# Patient Record
Sex: MALE | Race: WHITE | NOT HISPANIC OR LATINO | Employment: FULL TIME | URBAN - METROPOLITAN AREA
[De-identification: names, ages, dates, MRNs, and addresses within clinical notes are randomized per-mention and may not be internally consistent; named-entity substitution may affect disease eponyms.]

---

## 2017-01-07 ENCOUNTER — APPOINTMENT (OUTPATIENT)
Dept: GENERAL RADIOLOGY | Facility: HOSPITAL | Age: 52
End: 2017-01-07

## 2017-01-07 ENCOUNTER — HOSPITAL ENCOUNTER (EMERGENCY)
Facility: HOSPITAL | Age: 52
Discharge: HOME | End: 2017-01-08
Attending: EMERGENCY MEDICINE | Admitting: EMERGENCY MEDICINE

## 2017-01-07 DIAGNOSIS — N28.9 RENAL INSUFFICIENCY: ICD-10-CM

## 2017-01-07 DIAGNOSIS — M10.9 GOUTY ARTHRITIS: Primary | ICD-10-CM

## 2017-01-07 LAB
ALBUMIN SERPL-MCNC: 3.8 G/DL (ref 3.5–5.2)
ALBUMIN/GLOB SERPL: 0.9 G/DL
ALP SERPL-CCNC: 135 U/L (ref 39–117)
ALT SERPL W P-5'-P-CCNC: 48 U/L (ref 1–41)
ANION GAP SERPL CALCULATED.3IONS-SCNC: 16.5 MMOL/L
APPEARANCE FLD: ABNORMAL
AST SERPL-CCNC: 21 U/L (ref 1–40)
BASOPHILS # BLD AUTO: 0.03 10*3/MM3 (ref 0–0.2)
BASOPHILS NFR BLD AUTO: 0.2 % (ref 0–1.5)
BILIRUB SERPL-MCNC: 0.9 MG/DL (ref 0.1–1.2)
BUN BLD-MCNC: 39 MG/DL (ref 6–20)
BUN/CREAT SERPL: 23.1 (ref 7–25)
CALCIUM SPEC-SCNC: 9.7 MG/DL (ref 8.6–10.5)
CHLORIDE SERPL-SCNC: 97 MMOL/L (ref 98–107)
CO2 SERPL-SCNC: 24.5 MMOL/L (ref 22–29)
COLOR FLD: ABNORMAL
CREAT BLD-MCNC: 1.69 MG/DL (ref 0.76–1.27)
CRP SERPL-MCNC: 16.42 MG/DL (ref 0–0.5)
CRYSTALS FLD MICRO: NORMAL
DEPRECATED RDW RBC AUTO: 42.1 FL (ref 37–54)
EOSINOPHIL # BLD AUTO: 0.1 10*3/MM3 (ref 0–0.7)
EOSINOPHIL NFR BLD AUTO: 0.6 % (ref 0.3–6.2)
ERYTHROCYTE [DISTWIDTH] IN BLOOD BY AUTOMATED COUNT: 12.9 % (ref 11.5–14.5)
GFR SERPL CREATININE-BSD FRML MDRD: 43 ML/MIN/1.73
GLOBULIN UR ELPH-MCNC: 4.3 GM/DL
GLUCOSE BLD-MCNC: 125 MG/DL (ref 65–99)
HCT VFR BLD AUTO: 42.5 % (ref 40.4–52.2)
HGB BLD-MCNC: 13.6 G/DL (ref 13.7–17.6)
IMM GRANULOCYTES # BLD: 0.07 10*3/MM3 (ref 0–0.03)
IMM GRANULOCYTES NFR BLD: 0.4 % (ref 0–0.5)
LYMPHOCYTES # BLD AUTO: 1.21 10*3/MM3 (ref 0.9–4.8)
LYMPHOCYTES NFR BLD AUTO: 7.8 % (ref 19.6–45.3)
LYMPHOCYTES NFR FLD MANUAL: 8 %
MCH RBC QN AUTO: 28.8 PG (ref 27–32.7)
MCHC RBC AUTO-ENTMCNC: 32 G/DL (ref 32.6–36.4)
MCV RBC AUTO: 89.9 FL (ref 79.8–96.2)
METHOD: ABNORMAL
MONOCYTES # BLD AUTO: 1.4 10*3/MM3 (ref 0.2–1.2)
MONOCYTES NFR BLD AUTO: 9 % (ref 5–12)
MONOS+MACROS NFR FLD: 13 %
NEUTROPHILS # BLD AUTO: 12.76 10*3/MM3 (ref 1.9–8.1)
NEUTROPHILS NFR BLD AUTO: 82 % (ref 42.7–76)
NEUTROPHILS NFR FLD MANUAL: 79 %
NUC CELL # FLD: ABNORMAL /MM3
PLATELET # BLD AUTO: 251 10*3/MM3 (ref 140–500)
PMV BLD AUTO: 11.2 FL (ref 6–12)
POTASSIUM BLD-SCNC: 4.1 MMOL/L (ref 3.5–5.2)
PROT SERPL-MCNC: 8.1 G/DL (ref 6–8.5)
RBC # BLD AUTO: 4.73 10*6/MM3 (ref 4.6–6)
RBC # FLD AUTO: ABNORMAL /MM3
SODIUM BLD-SCNC: 138 MMOL/L (ref 136–145)
URATE SERPL-MCNC: 7.8 MG/DL (ref 3.4–7)
WBC NRBC COR # BLD: 15.57 10*3/MM3 (ref 4.5–10.7)

## 2017-01-07 PROCEDURE — 85025 COMPLETE CBC W/AUTO DIFF WBC: CPT | Performed by: EMERGENCY MEDICINE

## 2017-01-07 PROCEDURE — 87070 CULTURE OTHR SPECIMN AEROBIC: CPT | Performed by: PHYSICIAN ASSISTANT

## 2017-01-07 PROCEDURE — 87147 CULTURE TYPE IMMUNOLOGIC: CPT | Performed by: PHYSICIAN ASSISTANT

## 2017-01-07 PROCEDURE — 86140 C-REACTIVE PROTEIN: CPT | Performed by: EMERGENCY MEDICINE

## 2017-01-07 PROCEDURE — 89060 EXAM SYNOVIAL FLUID CRYSTALS: CPT | Performed by: PHYSICIAN ASSISTANT

## 2017-01-07 PROCEDURE — 80053 COMPREHEN METABOLIC PANEL: CPT | Performed by: EMERGENCY MEDICINE

## 2017-01-07 PROCEDURE — 87015 SPECIMEN INFECT AGNT CONCNTJ: CPT | Performed by: PHYSICIAN ASSISTANT

## 2017-01-07 PROCEDURE — 84550 ASSAY OF BLOOD/URIC ACID: CPT | Performed by: EMERGENCY MEDICINE

## 2017-01-07 PROCEDURE — 89051 BODY FLUID CELL COUNT: CPT | Performed by: PHYSICIAN ASSISTANT

## 2017-01-07 PROCEDURE — 87186 SC STD MICRODIL/AGAR DIL: CPT | Performed by: PHYSICIAN ASSISTANT

## 2017-01-07 PROCEDURE — 73560 X-RAY EXAM OF KNEE 1 OR 2: CPT

## 2017-01-07 PROCEDURE — 87205 SMEAR GRAM STAIN: CPT | Performed by: PHYSICIAN ASSISTANT

## 2017-01-07 PROCEDURE — 99284 EMERGENCY DEPT VISIT MOD MDM: CPT

## 2017-01-07 RX ORDER — PANTOPRAZOLE SODIUM 40 MG/1
1 TABLET, DELAYED RELEASE ORAL DAILY
Refills: 2 | COMMUNITY
Start: 2016-10-07

## 2017-01-07 RX ORDER — ZOLPIDEM TARTRATE 10 MG/1
1 TABLET ORAL NIGHTLY PRN
Refills: 0 | COMMUNITY
Start: 2016-11-29

## 2017-01-07 RX ORDER — ATENOLOL AND CHLORTHALIDONE TABLET 50; 25 MG/1; MG/1
1 TABLET ORAL DAILY
Refills: 0 | COMMUNITY
Start: 2016-10-07 | End: 2017-01-13 | Stop reason: HOSPADM

## 2017-01-07 RX ORDER — INDOMETHACIN 25 MG/1
1 CAPSULE ORAL 3 TIMES DAILY PRN
Refills: 2 | COMMUNITY
Start: 2016-11-29 | End: 2017-01-13 | Stop reason: HOSPADM

## 2017-01-07 RX ORDER — HYDROCODONE BITARTRATE AND ACETAMINOPHEN 7.5; 325 MG/1; MG/1
1 TABLET ORAL ONCE
Status: COMPLETED | OUTPATIENT
Start: 2017-01-07 | End: 2017-01-07

## 2017-01-07 RX ADMIN — HYDROCODONE BITARTRATE AND ACETAMINOPHEN 1 TABLET: 7.5; 325 TABLET ORAL at 21:12

## 2017-01-08 VITALS
SYSTOLIC BLOOD PRESSURE: 124 MMHG | HEIGHT: 67 IN | WEIGHT: 269 LBS | BODY MASS INDEX: 42.22 KG/M2 | RESPIRATION RATE: 18 BRPM | HEART RATE: 91 BPM | TEMPERATURE: 98.3 F | DIASTOLIC BLOOD PRESSURE: 81 MMHG | OXYGEN SATURATION: 97 %

## 2017-01-08 RX ORDER — COLCHICINE 0.6 MG/1
1.2 TABLET ORAL DAILY
Status: DISCONTINUED | OUTPATIENT
Start: 2017-01-08 | End: 2017-01-08 | Stop reason: HOSPADM

## 2017-01-08 RX ORDER — HYDROCODONE BITARTRATE AND ACETAMINOPHEN 7.5; 325 MG/1; MG/1
1 TABLET ORAL EVERY 6 HOURS PRN
Qty: 15 TABLET | Refills: 0 | Status: SHIPPED | OUTPATIENT
Start: 2017-01-08

## 2017-01-08 RX ORDER — COLCHICINE 0.6 MG/1
0.6 TABLET ORAL DAILY
Qty: 1 TABLET | Refills: 0 | Status: SHIPPED | OUTPATIENT
Start: 2017-01-08

## 2017-01-08 RX ADMIN — COLCHICINE 1.2 MG: 0.6 TABLET, FILM COATED ORAL at 00:31

## 2017-01-08 NOTE — ED PROVIDER NOTES
EMERGENCY DEPARTMENT ENCOUNTER    CHIEF COMPLAINT  Chief Complaint: right knee and ankle pain  History given by: patient  History limited by: nothing   Room Number: HALA/A  PMD: Kelsey Foy MD    HPI:  Pt is a 51 y.o. male who presents complaining of right knee and bilateral ankle pain onset approximately 4 days ago. Pt states the pain began as chest and right shoulder pain 3 weeks ago, at which time it was diagnosed at bicep tendonitis. Pt has been taking Indocin, Meloxicam, Diclofenac, and Advil. Pt has a h/o gout.     Duration:  3 weeks  Timing: constant  Location: right knee and bilateral ankle  Radiation: has moved from right shoulder/chest  Quality: new  Intensity/Severity: moderate   Progression: unchanged   Associated Symptoms: pain  Aggravating Factors: none specified   Alleviating Factors: none specified   Previous Episodes: h/o gout  Treatment before arrival: none specified     PAST MEDICAL HISTORY  Active Ambulatory Problems     Diagnosis Date Noted   • No Active Ambulatory Problems     Resolved Ambulatory Problems     Diagnosis Date Noted   • No Resolved Ambulatory Problems     Past Medical History   Diagnosis Date   • GERD (gastroesophageal reflux disease)    • Gout    • Hypertension    • Kidney stone        PAST SURGICAL HISTORY  Past Surgical History   Procedure Laterality Date   • Cholecystectomy     • Cystoscopy w/ laser lithotripsy         FAMILY HISTORY  History reviewed. No pertinent family history.    SOCIAL HISTORY  Social History     Social History   • Marital status:      Spouse name: N/A   • Number of children: N/A   • Years of education: N/A     Occupational History   • Not on file.     Social History Main Topics   • Smoking status: Never Smoker   • Smokeless tobacco: Not on file   • Alcohol use No   • Drug use: No   • Sexual activity: Defer     Other Topics Concern   • Not on file     Social History Narrative   • No narrative on file       ALLERGIES  Sulfa antibiotics    REVIEW  OF SYSTEMS  Review of Systems   Constitutional: Negative for activity change, appetite change and fever.   HENT: Negative for congestion and sore throat.    Eyes: Negative.    Respiratory: Negative for cough and shortness of breath.    Cardiovascular: Negative for chest pain and leg swelling.   Gastrointestinal: Negative for abdominal pain, diarrhea and vomiting.   Endocrine: Negative.    Genitourinary: Negative for decreased urine volume and dysuria.   Musculoskeletal: Negative for neck pain.   Skin: Negative for rash and wound.        bilateral ankle and right knee pain   Allergic/Immunologic: Negative.    Neurological: Negative for weakness, numbness and headaches.   Hematological: Negative.    Psychiatric/Behavioral: Negative.    All other systems reviewed and are negative.      PHYSICAL EXAM  ED Triage Vitals   Temp Heart Rate Resp BP SpO2   01/07/17 2001 01/07/17 2001 01/07/17 2001 01/07/17 2007 01/07/17 2001   98.5 °F (36.9 °C) 109 16 117/92 97 %      Temp src Heart Rate Source Patient Position BP Location FiO2 (%)   01/07/17 2001 01/07/17 2007 01/07/17 2007 01/07/17 2007 --   Tympanic Monitor Sitting Left arm        Physical Exam   Constitutional: He is oriented to person, place, and time and well-developed, well-nourished, and in no distress.   Eyes: EOM are normal.   Neck: Normal range of motion.   Cardiovascular: Normal rate and regular rhythm.    Pulmonary/Chest: Effort normal and breath sounds normal. No respiratory distress.   Musculoskeletal:        Right knee: He exhibits effusion and erythema.   Neurological: He is alert and oriented to person, place, and time. He has normal sensation and normal strength.   Skin: Skin is warm and dry.   Psychiatric: Affect normal.   Nursing note and vitals reviewed.      LAB RESULTS  Lab Results (last 24 hours)     Procedure Component Value Units Date/Time    CBC & Differential [[id number]] Collected:  01/07/17 2132    Specimen:  Blood Updated:  01/07/17 2151     Narrative:       The following orders were created for panel order CBC & Differential.  Procedure                               Abnormality         Status                     ---------                               -----------         ------                     CBC Auto Differential[[id number]]         Abnormal            Final result                 Please view results for these tests on the individual orders.    Comprehensive Metabolic Panel [[id number]]  (Abnormal) Collected:  01/07/17 2132    Specimen:  Blood from Arm, Right Updated:  01/07/17 2207     Glucose 125 (H) mg/dL      BUN 39 (H) mg/dL      Creatinine 1.69 (H) mg/dL      Sodium 138 mmol/L      Potassium 4.1 mmol/L      Chloride 97 (L) mmol/L      CO2 24.5 mmol/L      Calcium 9.7 mg/dL      Total Protein 8.1 g/dL      Albumin 3.80 g/dL      ALT (SGPT) 48 (H) U/L      AST (SGOT) 21 U/L      Alkaline Phosphatase 135 (H) U/L      Total Bilirubin 0.9 mg/dL      eGFR Non African Amer 43 (L) mL/min/1.73      Globulin 4.3 gm/dL      A/G Ratio 0.9 g/dL      BUN/Creatinine Ratio 23.1      Anion Gap 16.5 mmol/L     C-reactive Protein [[id number]]  (Abnormal) Collected:  01/07/17 2132    Specimen:  Blood from Arm, Right Updated:  01/07/17 2206     C-Reactive Protein 16.42 (H) mg/dL     Uric Acid [[id number]]  (Abnormal) Collected:  01/07/17 2132    Specimen:  Blood from Arm, Right Updated:  01/07/17 2206     Uric Acid 7.8 (H) mg/dL     CBC Auto Differential [[id number]]  (Abnormal) Collected:  01/07/17 2132    Specimen:  Blood from Arm, Right Updated:  01/07/17 2151     WBC 15.57 (H) 10*3/mm3      RBC 4.73 10*6/mm3      Hemoglobin 13.6 (L) g/dL      Hematocrit 42.5 %      MCV 89.9 fL      MCH 28.8 pg      MCHC 32.0 (L) g/dL      RDW 12.9 %      RDW-SD 42.1 fl      MPV 11.2 fL      Platelets 251 10*3/mm3      Neutrophil % 82.0 (H) %      Lymphocyte % 7.8 (L) %      Monocyte % 9.0 %      Eosinophil % 0.6 %      Basophil % 0.2 %      Immature Grans % 0.4 %      Neutrophils,  Absolute 12.76 (H) 10*3/mm3      Lymphocytes, Absolute 1.21 10*3/mm3      Monocytes, Absolute 1.40 (H) 10*3/mm3      Eosinophils, Absolute 0.10 10*3/mm3      Basophils, Absolute 0.03 10*3/mm3      Immature Grans, Absolute 0.07 (H) 10*3/mm3     Body Fluid Cell Count With Differential [[id number]] Collected:  01/07/17 2215    Specimen:  Body Fluid Updated:  01/07/17 2330    Narrative:       The following orders were created for panel order Body Fluid Cell Count With Differential.  Procedure                               Abnormality         Status                     ---------                               -----------         ------                     Body fluid cell count[[id number]]         Abnormal            Final result               Body fluid differential[[id number]]                           Final result                 Please view results for these tests on the individual orders.    Body fluid cell count [[id number]]  (Abnormal) Collected:  01/07/17 2215    Specimen:  Body Fluid from Knee, Right Updated:  01/07/17 2319     Color, Fluid Other       Dark yellow        Appearance, Fluid Cloudy (A)      RBC, Fluid 94752 /mm3       Estimated count due to clots present in specimen.        Nucleated Cells, Fluid 27432 /mm3       Estimated count due to clots present in specimen.         Method: Hemacytometer Method     Body fluid differential [[id number]] Collected:  01/07/17 2215    Specimen:  Body Fluid from Knee, Right Updated:  01/07/17 2330     Neutrophils, Fluid 79 %      Lymphocytes, Fluid 8 %      Mononuclear, Fluid 13 %     Body Fluid Culture [[id number]] Collected:  01/07/17 2216    Specimen:  Body Fluid from Knee, Right Updated:  01/08/17 0055     Gram Stain Result No organisms seen     Crystal Exam, Fluid [[id number]] Collected:  01/07/17 2252    Specimen:  Synovial Fluid from Knee, Right Updated:  01/07/17 2336     Crystals, Fluid        Intracellular crystals observed exhibiting polarization characteristics of Uric  Acid          I ordered the above labs and reviewed the results     RADIOLOGY  XR Knee 1 or 2 View Right   Preliminary Result   No acute fracture or dislocation. Moderately large knee joint effusion.             I ordered the above noted radiological studies. Interpreted by radiologist. Reviewed by me in PACS.       PROCEDURES  Arthrocentesis  Date/Time: 1/7/2017 9:54 PM  Performed by: JOSEMANUEL LUDWIG  Authorized by: JOSEMANUEL LUDWIG   Consent: Verbal consent obtained.  Risks and benefits: risks, benefits and alternatives were discussed  Consent given by: patient  Patient understanding: patient states understanding of the procedure being performed  Patient consent: the patient's understanding of the procedure matches consent given  Procedure consent: procedure consent matches procedure scheduled  Relevant documents: relevant documents present and verified  Test results: test results available and properly labeled  Site marked: the operative site was marked  Imaging studies: imaging studies available  Required items: required blood products, implants, devices, and special equipment available  Patient identity confirmed: verbally with patient  Indications: joint swelling, pain and diagnostic evaluation   Body area: knee  Joint: right knee  Local anesthesia used: yes  Anesthesia: local infiltration    Anesthesia:  Local anesthesia used: yes  Anesthesia: local infiltration  Local Anesthetic: lidocaine 1% without epinephrine   Anesthetic total: 10 mL  Sedation:  Patient sedated: no    Preparation: Patient was prepped and draped in the usual sterile fashion.  Needle size: 18 G  Ultrasound guidance: no  Aspirate: blood-tinged  Patient tolerance: Patient tolerated the procedure well with no immediate complications        PROGRESS AND CONSULTS  ED Course   Value Comment By Time   Color, Fluid: Other (Reviewed) Litzy Chan MD 01/08 0001 6511: Pt refuses to have fluid removed from knee at this time. Ordered XR right knee,  labs, and norco.     2114: Discussed pt's case with Dr. Beckford, who agrees with plan of care.     2139: Rechecked pt. Pt is unchanged. I encouraged pt to allow us to remove fluid to evaluate for gout.    0005: Rechecked pt. Pt is resting comfortably. Discussed elevated kidney function tests, and pt's spouse reports that it has been elevated in the past.     0031: Rechecked pt. Discussed dx of gout and plan for discharge and PCP f/u in a few days. Pt understands and agrees with plan for discharge and all questions were addressed.     MEDICAL DECISION MAKING  Results were reviewed/discussed with the patient and they were also made aware of online access. Pt also made aware that some labs, such as cultures, will not be resulted during ER visit and follow up with PMD is necessary.     MDM  Number of Diagnoses or Management Options     Amount and/or Complexity of Data Reviewed  Clinical lab tests: ordered and reviewed  Tests in the radiology section of CPT®: ordered and reviewed    Patient Progress  Patient progress: stable         DIAGNOSIS  Final diagnoses:   Gouty arthritis   Renal insufficiency       DISPOSITION  DISCHARGE    Patient discharged in stable condition.    Reviewed implications of results, diagnosis, meds, responsibility to follow up, warning signs and symptoms of possible worsening, potential complications and reasons to return to ER, including worsening of sx.    Patient/Family voiced understanding of above instructions.    Discussed plan for discharge, as there is no emergent indication for admission.  Pt/family is agreeable and understands need for follow up and repeat testing.  Pt is aware that discharge does not mean that nothing is wrong but it indicates no emergency is present that requires admission and they must continue care with follow-up as given below or physician of their choice.     FOLLOW-UP  Kelsey Foy MD  [address] [facility] [facility] [address] [facility] [address] [phone]    In 2 days  For further  evaluation and treatment if not better         Medication List      New Prescriptions          colchicine 0.6 MG tablet   Take 1 tablet by mouth Daily.       HYDROcodone-acetaminophen 7.5-325 MG per tablet   Commonly known as:  NORCO   Take 1 tablet by mouth Every 6 (Six) Hours As Needed for severe pain   (7-10).         Stop          diclofenac 75 MG EC tablet   Commonly known as:  VOLTAREN       indomethacin 25 MG capsule   Commonly known as:  INDOCIN           Latest Documented Vital Signs:  As of 4:39 AM  BP- 124/81 HR- 91 Temp- 98.3 °F (36.8 °C) (Oral) O2 sat- 97%    --  Documentation assistance provided by kristie Becerra for Jaime Braswell.  Information recorded by the cassiibjenifer was done at my direction and has been verified and validated by me.              Beronica Becerra  01/08/17 0036       KEVIN Amin III  01/08/17 0438       KEVIN Amin III  01/08/17 0439

## 2017-01-08 NOTE — ED PROVIDER NOTES
51 y.o. male presents c/o RLE pain and swelling onset 4 days ago. Hx of gout.     On exam:  Awake and alert  Right knee tender swollen with effusion  No significant erythema to the knee    Will do arthrocentesis.     I supervised care provided by the midlevel provider.  We have discussed this patient's history, physical exam, and treatment plan.  I have reviewed the note and personally saw and examined the patient and agree with the plan of care.    --  Documentation assistance provided by kristie Bunch.  Information recorded by the kristie was done at my direction and has been verified and validated by me.     Manjula Bunch  01/07/17 9681       Kris Beckford MD  01/10/17 1211

## 2017-01-08 NOTE — DISCHARGE INSTRUCTIONS
Do not take ibuprofen, naproxen, or other NSAIDs.  Return to the ER with any further concerns.  Follow up with your family physician in next 48-72 hours for further evaluation of therapy and to discuss renal insufficiency.  Avoid purines

## 2017-01-11 ENCOUNTER — HOSPITAL ENCOUNTER (INPATIENT)
Facility: HOSPITAL | Age: 52
LOS: 1 days | Discharge: HOME | End: 2017-01-13
Attending: EMERGENCY MEDICINE | Admitting: HOSPITALIST

## 2017-01-11 DIAGNOSIS — M13.0 POLYARTICULAR ARTHRITIS: Primary | ICD-10-CM

## 2017-01-11 DIAGNOSIS — R26.2 DIFFICULTY WALKING: ICD-10-CM

## 2017-01-11 DIAGNOSIS — N28.9 RENAL INSUFFICIENCY: ICD-10-CM

## 2017-01-11 LAB
ALBUMIN SERPL-MCNC: 3.7 G/DL (ref 3.5–5.2)
ALBUMIN/GLOB SERPL: 0.8 G/DL
ALP SERPL-CCNC: 162 U/L (ref 39–117)
ALT SERPL W P-5'-P-CCNC: 48 U/L (ref 1–41)
ANION GAP SERPL CALCULATED.3IONS-SCNC: 12.9 MMOL/L
AST SERPL-CCNC: 28 U/L (ref 1–40)
BACTERIA FLD CULT: ABNORMAL
BACTERIA FLD CULT: ABNORMAL
BASOPHILS # BLD AUTO: 0.11 10*3/MM3 (ref 0–0.2)
BASOPHILS NFR BLD AUTO: 0.9 % (ref 0–1.5)
BILIRUB SERPL-MCNC: 0.7 MG/DL (ref 0.1–1.2)
BUN BLD-MCNC: 46 MG/DL (ref 6–20)
BUN/CREAT SERPL: 26.6 (ref 7–25)
CALCIUM SPEC-SCNC: 9.7 MG/DL (ref 8.6–10.5)
CHLORIDE SERPL-SCNC: 96 MMOL/L (ref 98–107)
CO2 SERPL-SCNC: 29.1 MMOL/L (ref 22–29)
CREAT BLD-MCNC: 1.73 MG/DL (ref 0.76–1.27)
CRP SERPL-MCNC: 11.33 MG/DL (ref 0–0.5)
D-LACTATE SERPL-SCNC: 0.8 MMOL/L (ref 0.5–2)
DEPRECATED RDW RBC AUTO: 44.2 FL (ref 37–54)
EOSINOPHIL # BLD AUTO: 0.23 10*3/MM3 (ref 0–0.7)
EOSINOPHIL NFR BLD AUTO: 1.9 % (ref 0.3–6.2)
ERYTHROCYTE [DISTWIDTH] IN BLOOD BY AUTOMATED COUNT: 13.2 % (ref 11.5–14.5)
ERYTHROCYTE [SEDIMENTATION RATE] IN BLOOD: 44 MM/HR (ref 0–15)
GFR SERPL CREATININE-BSD FRML MDRD: 42 ML/MIN/1.73
GLOBULIN UR ELPH-MCNC: 4.7 GM/DL
GLUCOSE BLD-MCNC: 127 MG/DL (ref 65–99)
GRAM STN SPEC: ABNORMAL
HCT VFR BLD AUTO: 43.1 % (ref 40.4–52.2)
HGB BLD-MCNC: 13.3 G/DL (ref 13.7–17.6)
IMM GRANULOCYTES # BLD: 0.04 10*3/MM3 (ref 0–0.03)
IMM GRANULOCYTES NFR BLD: 0.3 % (ref 0–0.5)
LYMPHOCYTES # BLD AUTO: 1.81 10*3/MM3 (ref 0.9–4.8)
LYMPHOCYTES NFR BLD AUTO: 15 % (ref 19.6–45.3)
MCH RBC QN AUTO: 28.4 PG (ref 27–32.7)
MCHC RBC AUTO-ENTMCNC: 30.9 G/DL (ref 32.6–36.4)
MCV RBC AUTO: 92.1 FL (ref 79.8–96.2)
MONOCYTES # BLD AUTO: 0.9 10*3/MM3 (ref 0.2–1.2)
MONOCYTES NFR BLD AUTO: 7.5 % (ref 5–12)
NEUTROPHILS # BLD AUTO: 8.96 10*3/MM3 (ref 1.9–8.1)
NEUTROPHILS NFR BLD AUTO: 74.4 % (ref 42.7–76)
PLATELET # BLD AUTO: 367 10*3/MM3 (ref 140–500)
PMV BLD AUTO: 10.7 FL (ref 6–12)
POTASSIUM BLD-SCNC: 4.8 MMOL/L (ref 3.5–5.2)
PROCALCITONIN SERPL-MCNC: 0.17 NG/ML (ref 0.1–0.25)
PROT SERPL-MCNC: 8.4 G/DL (ref 6–8.5)
RBC # BLD AUTO: 4.68 10*6/MM3 (ref 4.6–6)
SODIUM BLD-SCNC: 138 MMOL/L (ref 136–145)
URATE SERPL-MCNC: 12.2 MG/DL (ref 3.4–7)
WBC NRBC COR # BLD: 12.05 10*3/MM3 (ref 4.5–10.7)

## 2017-01-11 PROCEDURE — 84550 ASSAY OF BLOOD/URIC ACID: CPT | Performed by: EMERGENCY MEDICINE

## 2017-01-11 PROCEDURE — 80053 COMPREHEN METABOLIC PANEL: CPT | Performed by: EMERGENCY MEDICINE

## 2017-01-11 PROCEDURE — 83605 ASSAY OF LACTIC ACID: CPT | Performed by: EMERGENCY MEDICINE

## 2017-01-11 PROCEDURE — 99284 EMERGENCY DEPT VISIT MOD MDM: CPT

## 2017-01-11 PROCEDURE — 86140 C-REACTIVE PROTEIN: CPT | Performed by: EMERGENCY MEDICINE

## 2017-01-11 PROCEDURE — 85652 RBC SED RATE AUTOMATED: CPT | Performed by: EMERGENCY MEDICINE

## 2017-01-11 PROCEDURE — 85025 COMPLETE CBC W/AUTO DIFF WBC: CPT | Performed by: EMERGENCY MEDICINE

## 2017-01-11 PROCEDURE — 36415 COLL VENOUS BLD VENIPUNCTURE: CPT | Performed by: EMERGENCY MEDICINE

## 2017-01-11 PROCEDURE — 84145 PROCALCITONIN (PCT): CPT | Performed by: EMERGENCY MEDICINE

## 2017-01-11 RX ORDER — SODIUM CHLORIDE 9 MG/ML
125 INJECTION, SOLUTION INTRAVENOUS CONTINUOUS
Status: DISCONTINUED | OUTPATIENT
Start: 2017-01-11 | End: 2017-01-12

## 2017-01-11 RX ORDER — SODIUM CHLORIDE 0.9 % (FLUSH) 0.9 %
10 SYRINGE (ML) INJECTION AS NEEDED
Status: DISCONTINUED | OUTPATIENT
Start: 2017-01-11 | End: 2017-01-13 | Stop reason: HOSPADM

## 2017-01-11 RX ADMIN — SODIUM CHLORIDE 500 ML: 9 INJECTION, SOLUTION INTRAVENOUS at 23:12

## 2017-01-11 NOTE — IP AVS SNAPSHOT
AFTER VISIT SUMMARY             Farhan Ralph           About your hospitalization     You were admitted on:  January 12, 2017 You last received care in the:  [address]       Procedures & Surgeries         Medications    If you or your caregiver advised us that you are currently taking a medication and that medication is marked below as “Resume”, this simply indicates that we have reviewed those medications to make sure our new therapy recommendations do not interfere.  If you have concerns about medications other than those new ones which we are prescribing today, please consult the physician who prescribed them (or your primary physician).  Our review of your home medications is not meant to indicate that we are directing their use.             Your Medications      START taking these medications     allopurinol 300 MG tablet   Take 1 tablet by mouth Daily.   Commonly known as:  ZYLOPRIM           predniSONE 20 MG tablet   Take 2 tablets by mouth Daily With Breakfast. Take 2 pills daily for 1 week the take 1 pill daily for 1 week then stop.   Last time this was given:  1/13/2017  9:47 AM   Commonly known as:  DELTASONE             CHANGE how you take these medications     atenolol 50 MG tablet   Take 1 tablet by mouth Daily.   Last time this was given:  1/13/2017  9:47 AM   Commonly known as:  TENORMIN   What changed:    - medication strength  - how much to take  - when to take this             CONTINUE taking these medications     colchicine 0.6 MG tablet   Take 1 tablet by mouth Daily.   Last time this was given:  1/13/2017  9:47 AM           HYDROcodone-acetaminophen 7.5-325 MG per tablet   Take 1 tablet by mouth Every 6 (Six) Hours As Needed for severe pain (7-10).   Commonly known as:  NORCO           pantoprazole 40 MG EC tablet   Take 1 tablet by mouth Daily.   Last time this was given:  1/13/2017  5:11 AM   Commonly known as:  PROTONIX           zolpidem 10 MG tablet   Take 1  tablet by mouth At Night As Needed.   Commonly known as:  AMBIEN             STOP taking these medications     atenolol-chlorthalidone 50-25 MG per tablet   Commonly known as:  TENORETIC           diclofenac 75 MG EC tablet   Commonly known as:  VOLTAREN           indomethacin 25 MG capsule   Commonly known as:  INDOCIN           NIACIN CR PO                Where to Get Your Medications      You can get these medications from any pharmacy     Bring a paper prescription for each of these medications     allopurinol 300 MG tablet    atenolol 50 MG tablet    predniSONE 20 MG tablet                  Your Medications      Your Medication List           Morning Noon Evening Bedtime As Needed    allopurinol 300 MG tablet   Take 1 tablet by mouth Daily.   Commonly known as:  ZYLOPRIM                                   atenolol 50 MG tablet   Take 1 tablet by mouth Daily.   Commonly known as:  TENORMIN                                   colchicine 0.6 MG tablet   Take 1 tablet by mouth Daily.                                   HYDROcodone-acetaminophen 7.5-325 MG per tablet   Take 1 tablet by mouth Every 6 (Six) Hours As Needed for severe pain (7-10).   Commonly known as:  NORCO                                   pantoprazole 40 MG EC tablet   Take 1 tablet by mouth Daily.   Commonly known as:  PROTONIX                                   predniSONE 20 MG tablet   Take 2 tablets by mouth Daily With Breakfast. Take 2 pills daily for 1 week the take 1 pill daily for 1 week then stop.   Commonly known as:  DELTASONE                                   zolpidem 10 MG tablet   Take 1 tablet by mouth At Night As Needed.   Commonly known as:  AMBIEN                                            Instructions for After Discharge        Activity Instructions     Activity as Tolerated                 Diet Instructions     Diet: Cardiac; Thin Liquids, No Restrictions       Discharge Diet:  Cardiac   Fluid Consistency:  Thin Liquids, No  Restrictions             Other Instructions     Walker       Equipment:   Standard Walker Folding   Length of Need (99 Months = Lifetime):  Other (specify) Comment - 1 month.              Discharge References/Attachments     CARDIAC DIET (ENGLISH)    WALKER USE (ENGLISH)    GOUT (ENGLISH)       Follow-ups for After Discharge        Follow-up Information     Follow up with Kelsey Foy MD .    Specialty:  Family Medicine    Contact information:    [address]  [facility] [id number] [phone]        Referrals and Follow-ups to Schedule     Follow-Up    As directed    Follow up with Orthopedic Surgery as directed.   Follow Up Details:  PCP in 1 week.             [employer] Signup     Our records indicate that you have an active [employer] account.    You can view your After Visit Summary by going to [employer] and logging in with your N[employer] username and password.  If you don't have a [employer] username and password but a parent or guardian has access to your record, the parent or guardian should login with their own Noman[employer] username and password and access your record to view the After Visit Summary.    If you have questions, you can email [email or web address] or call [phone] to talk to our Noman[employer] staff.  Remember, [employer] is NOT to be used for urgent needs.  For medical emergencies, dial 911.           Summary of Your Hospitalization        Reason for Hospitalization     Your primary diagnosis was:  Not on File    Your diagnoses also included:  Polyarticular Arthritis      Care Providers     Provider Service Role Specialty    Stan Nunez MD Internal Medicine Attending Provider Internal Medicine    Stan Nunez MD Internal Medicine Consulting Physician  Internal Medicine       Your Allergies  Date Reviewed: 1/12/2017    Allergen Reactions    Sulfa Antibiotics Other (See Comments)    UNSURE  Pt states unknown reaction, that his mother always told him  that he was allergic.      Pending Labs     Order Current Status    Cyclic Citrul Peptide Antibody, IgG / IgA In process      Patient Belongings Returned     Document Return of Belongings Flowsheet     Were the patient bedside belongings sent home?   --   Belongings Retrieved from Security & Sent Home   --    Belongings Sent to Safe   --   Medications Retrieved from Pharmacy & Sent Home   --              More Information      Heart-Healthy Eating Plan  Many factors influence your heart health, including eating and exercise habits. Heart (coronary) risk increases with abnormal blood fat (lipid) levels. Heart-healthy meal planning includes limiting unhealthy fats, increasing healthy fats, and making other small dietary changes. This includes maintaining a healthy body weight to help keep lipid levels within a normal range.  WHAT IS MY PLAN?   Your health care provider recommends that you:  · Get no more than _________% of the total calories in your daily diet from fat.  · Limit your intake of saturated fat to less than _________% of your total calories each day.  · Limit the amount of cholesterol in your diet to less than _________ mg per day.  WHAT TYPES OF FAT SHOULD I CHOOSE?  · Choose healthy fats more often. Choose monounsaturated and polyunsaturated fats, such as olive oil and canola oil, flaxseeds, walnuts, almonds, and seeds.  · Eat more omega-3 fats. Good choices include salmon, mackerel, sardines, tuna, flaxseed oil, and ground flaxseeds. Aim to eat fish at least two times each week.  · Limit saturated fats. Saturated fats are primarily found in animal products, such as meats, butter, and cream. Plant sources of saturated fats include palm oil, palm kernel oil, and coconut oil.  · Avoid foods with partially hydrogenated oils in them. These contain trans fats. Examples of foods that contain trans fats are stick margarine, some tub margarines, cookies, crackers, and other baked goods.  WHAT GENERAL GUIDELINES  "DO I NEED TO FOLLOW?  · Check food labels carefully to identify foods with trans fats or high amounts of saturated fat.  · Fill one half of your plate with vegetables and green salads. Eat 4-5 servings of vegetables per day. A serving of vegetables equals 1 cup of raw leafy vegetables, ½ cup of raw or cooked cut-up vegetables, or ½ cup of vegetable juice.  · Fill one fourth of your plate with whole grains. Look for the word \"whole\" as the first word in the ingredient list.  · Fill one fourth of your plate with lean protein foods.  · Eat 4-5 servings of fruit per day. A serving of fruit equals one medium whole fruit, ¼ cup of dried fruit, ½ cup of fresh, frozen, or canned fruit, or ½ cup of 100% fruit juice.  · Eat more foods that contain soluble fiber. Examples of foods that contain this type of fiber are apples, broccoli, carrots, beans, peas, and barley. Aim to get 20-30 g of fiber per day.  · Eat more home-cooked food and less restaurant, buffet, and fast food.  · Limit or avoid alcohol.  · Limit foods that are high in starch and sugar.  · Avoid fried foods.  · Cook foods by using methods other than frying. Baking, boiling, grilling, and broiling are all great options. Other fat-reducing suggestions include:    Removing the skin from poultry.    Removing all visible fats from meats.    Skimming the fat off of stews, soups, and gravies before serving them.    Steaming vegetables in water or broth.  · Lose weight if you are overweight. Losing just 5-10% of your initial body weight can help your overall health and prevent diseases such as diabetes and heart disease.  · Increase your consumption of nuts, legumes, and seeds to 4-5 servings per week. One serving of dried beans or legumes equals ½ cup after being cooked, one serving of nuts equals 1½ ounces, and one serving of seeds equals ½ ounce or 1 tablespoon.  · You may need to monitor your salt (sodium) intake, especially if you have high blood pressure. Talk with " your health care provider or dietitian to get more information about reducing sodium.  WHAT FOODS CAN I EAT?  Grains  Breads, including Pashto, white, miguel, wheat, raisin, rye, oatmeal, and Italian. Tortillas that are neither fried nor made with lard or trans fat. Low-fat rolls, including hotdog and hamburger buns and English muffins. Biscuits. Muffins. Waffles. Pancakes. Light popcorn. Whole-grain cereals. Flatbread. Sweta toast. Pretzels. Breadsticks. Rusks. Low-fat snacks and crackers, including oyster, saltine, matzo, carmen, animal, and rye. Rice and pasta, including brown rice and those that are made with whole wheat.  Vegetables  All vegetables.  Fruits  All fruits, but limit coconut.  Meats and Other Protein Sources  Lean, well-trimmed beef, veal, pork, and lamb. Chicken and turkey without skin. All fish and shellfish. Wild duck, rabbit, pheasant, and venison. Egg whites or low-cholesterol egg substitutes. Dried beans, peas, lentils, and tofu. Seeds and most nuts.  Dairy  Low-fat or nonfat cheeses, including ricotta, string, and mozzarella. Skim or 1% milk that is liquid, powdered, or evaporated. Buttermilk that is made with low-fat milk. Nonfat or low-fat yogurt.  Beverages  Mineral water. Diet carbonated beverages.  Sweets and Desserts  Sherbets and fruit ices. Honey, jam, marmalade, jelly, and syrups. Meringues and gelatins. Pure sugar candy, such as hard candy, jelly beans, gumdrops, mints, marshmallows, and small amounts of dark chocolate. Gregg food cake.  Eat all sweets and desserts in moderation.  Fats and Oils  Nonhydrogenated (trans-free) margarines. Vegetable oils, including soybean, sesame, sunflower, olive, peanut, safflower, corn, canola, and cottonseed. Salad dressings or mayonnaise that are made with a vegetable oil. Limit added fats and oils that you use for cooking, baking, salads, and as spreads.  Other  Cocoa powder. Coffee and tea. All seasonings and condiments.  The items listed above  may not be a complete list of recommended foods or beverages. Contact your dietitian for more options.  WHAT FOODS ARE NOT RECOMMENDED?  Grains  Breads that are made with saturated or trans fats, oils, or whole milk. Croissants. Butter rolls. Cheese breads. Sweet rolls. Donuts. Buttered popcorn. Chow mein noodles. High-fat crackers, such as cheese or butter crackers.  Meats and Other Protein Sources  Fatty meats, such as hotdogs, short ribs, sausage, spareribs, leon, ribeye roast or steak, and mutton. High-fat deli meats, such as salami and bologna. Caviar. Domestic duck and goose. Organ meats, such as kidney, liver, sweetbreads, brains, gizzard, chitterlings, and heart.  Dairy  Cream, sour cream, cream cheese, and creamed cottage cheese. Whole milk cheeses, including blue (alea), Mousie Nasir, Brie, Valentino, American, Havarti, Swiss, cheddar, Camembert, and [place].  Whole or 2% milk that is liquid, evaporated, or condensed. Whole buttermilk. Cream sauce or high-fat cheese sauce. Yogurt that is made from whole milk.  Beverages  Regular sodas and drinks with added sugar.  Sweets and Desserts  Frosting. Pudding. Cookies. Cakes other than anne food cake. Candy that has milk chocolate or white chocolate, hydrogenated fat, butter, coconut, or unknown ingredients. Buttered syrups. Full-fat ice cream or ice cream drinks.  Fats and Oils  Gravy that has suet, meat fat, or shortening. Cocoa butter, hydrogenated oils, palm oil, coconut oil, palm kernel oil. These can often be found in baked products, candy, fried foods, nondairy creamers, and whipped toppings. Solid fats and shortenings, including leon fat, salt pork, lard, and butter. Nondairy cream substitutes, such as coffee creamers and sour cream substitutes. Salad dressings that are made of unknown oils, cheese, or sour cream.  The items listed above may not be a complete list of foods and beverages to avoid. Contact your dietitian for more information.     This  information is not intended to replace advice given to you by your health care provider. Make sure you discuss any questions you have with your health care provider.     Document Released: 09/26/2009 Document Revised: 01/08/2016 Document Reviewed: 06/11/2015  [employer] Patient Education ©2016 [employer]          Walker Use  HOW TO TELL IF A WALKER IS THE RIGHT SIZE  · With your arms hanging at your sides, the walker handles should be at wrist level. If you cannot find the exact fit, choose the height that is most comfortable.  · If you have been instructed to not place weight on one of your legs, you may feel more comfortable with a shorter height. If you are using the walker for balance, you may prefer a taller height.  · Adjust the height by using the push buttons on the legs of your walker.  · In rest position, the back leg of the walkers should be no further ahead than your toes. With your hands resting on the , your elbows should be slightly bent at about a 30 degree angle.  · Ask your physical therapist or caregiver if you have any concerns.  HOW TO USE A STANDARD WALKER (NO WHEELS)  · Pick your walker up (do not slide your walker) and place it one step length in front of you. The back legs of the walker should be no further ahead than your toes. You should not feel like you need to lean forward to keep your hands on the . As you set the walker down, make sure all 4 leg tips contact the ground at the same time.  · Hold onto the walker for support and step forward with your weaker leg into the middle of the walker. Follow the weight bearing instructions your caregiver has given you.  · Push down with your hands and step forward with your stronger leg.  · Be careful not to let the walker get too far ahead of you as you walk.  · Repeat the process for each step.  HOW TO USE A FRONT-WHEELED WALKER  · Slide your walker forward. The back legs of the walker should be no further ahead than your  "toes. You should not feel like you need to lean forward to keep your hands on the .  · Hold onto the walker for support and step forward with your weaker leg into the middle of the walker. Follow the weight bearing instructions your caregiver has given you.  · Push down with your hands and step forward with your stronger leg.  · Be careful not to let the walker get too far ahead of you as you walk.  · Repeat the process for each step.  · If your walker does not glide well over carpet, consider cutting an \"x\" in 2 old tennis balls and placing them over the back legs of your walker.  STANDING UP FROM A CHAIR WITH ARMRESTS  · It is best to sit in a firm chair with armrests.  · Position your walker directly in front of your chair. Do not pull on the walker when standing up. It is too unstable to support weight when pulled on.  · Slide forward in the chair, with your weaker leg ahead and stronger leg bent near the chair.  · Lean forward and push up from your chair with both hands on the armrests. Straighten your stronger leg, rising to standing. Do not pull yourself up from the walker. This may cause it to tip.  · When you feel steady on your feet, carefully move one hand at a time to the walker.  · Stand for a few seconds to stabilize your balance before you start to walk.  STANDING UP FROM A CHAIR WITHOUT ARMRESTS  · It is best to sit in a firm chair. A low seat or an overstuffed chair or sofa is hard to get out of.  · Place the walker in front of you. Do not pull on the walker when coming to a standing position.  · Slide forward in the chair, with your weaker leg ahead and stronger leg bent near the chair.  · Push down on the chair seat with the hand opposite your weaker leg. Keep your other hand on the center of the walker's crossbar.  · Stand, steady your balance, and place your hands on the walker handgrips.  SITTING DOWN  · Always back up toward your chair, using your walker, until you feel the back of your " legs touch the chair.  · If the chair has armrests, carefully reach back to put your hands on the armrests, and slowly lower your weight.  · If the chair does not have armrests, consider backing up to the side of the chair. You can then hold onto the back of the chair and the front of the seat to slowly lower yourself.  · You should never feel like you are falling into your chair.  USING A WALKER ON STEPS  ·  Before attempting to use your walker on steps, practice with your physical therapist.  · If you are going up a step wide enough to accommodate the entire walker and yourself:    First, place the walker up on the step.    Second, get your feet as close to the step as you can.    Third, press down on the walker with your hands as you step up with your stronger leg. Then step up with your weaker leg.  · If you are going down a step wide enough to accommodate the entire walker and yourself:    First, place the walker down on the step.    Second, hold onto the walker as you step down with your weaker leg. Then step down with your stronger leg.  · If you are going up more than 1 step and have a railing:    First, turn the walker sideways, so the opening is facing in toward you.    Second, place the front 2 legs of the walker on the first step. These front legs should be positioned at the base of the next step.    Third, test the steadiness of the walker. It should feel sturdy when you press down on the handgrip that is facing the top of the steps.    Finally, placing your weight on the railing and the walker, step up with your stronger leg first. Then step up with your weaker leg.  · If you are going down more than 1 step and have a railing:    First, turn the walker sideways, so the opening is facing in toward you.    Second, place the front 2 legs of the walker down on the first step. When possible, the back legs of the walker should be positioned at the base of the previous step.    Third, test the steadiness of  the walker. It should feel sturdy when you press down on the handgrip that is facing the top of the steps.    Finally, placing your weight on the railing and the walker, step down with your weaker leg first. Then step down with your stronger leg.  · Be sure to check the sturdiness of the walker before each step.  · Make sure you have good rubber tips on the legs of your walker to prevent it from slipping.     This information is not intended to replace advice given to you by your health care provider. Make sure you discuss any questions you have with your health care provider.     Document Released: 12/18/2006 Document Revised: 03/11/2013 Document Reviewed: 07/01/2016  [employer] Patient Education ©2016 [employer]          Gout  Gout is an inflammatory arthritis caused by a buildup of uric acid crystals in the joints. Uric acid is a chemical that is normally present in the blood. When the level of uric acid in the blood is too high it can form crystals that deposit in your joints and tissues. This causes joint redness, soreness, and swelling (inflammation). Repeat attacks are common. Over time, uric acid crystals can form into masses (tophi) near a joint, destroying bone and causing disfigurement. Gout is treatable and often preventable.  CAUSES   The disease begins with elevated levels of uric acid in the blood. Uric acid is produced by your body when it breaks down a naturally found substance called purines. Certain foods you eat, such as meats and fish, contain high amounts of purines. Causes of an elevated uric acid level include:  · Being passed down from parent to child (heredity).  · Diseases that cause increased uric acid production (such as obesity, psoriasis, and certain cancers).  · Excessive alcohol use.  · Diet, especially diets rich in meat and seafood.  · Medicines, including certain cancer-fighting medicines (chemotherapy), water pills (diuretics), and aspirin.  · Chronic kidney disease.  The kidneys are no longer able to remove uric acid well.  · Problems with metabolism.  Conditions strongly associated with gout include:  · Obesity.  · High blood pressure.  · High cholesterol.  · Diabetes.  Not everyone with elevated uric acid levels gets gout. It is not understood why some people get gout and others do not. Surgery, joint injury, and eating too much of certain foods are some of the factors that can lead to gout attacks.  SYMPTOMS   · An attack of gout comes on quickly. It causes intense pain with redness, swelling, and warmth in a joint.  · Fever can occur.  · Often, only one joint is involved. Certain joints are more commonly involved:    Base of the big toe.    Knee.    Ankle.    Wrist.    Finger.  Without treatment, an attack usually goes away in a few days to weeks. Between attacks, you usually will not have symptoms, which is different from many other forms of arthritis.  DIAGNOSIS   Your caregiver will suspect gout based on your symptoms and exam. In some cases, tests may be recommended. The tests may include:  · Blood tests.  · Urine tests.  · X-rays.  · Joint fluid exam. This exam requires a needle to remove fluid from the joint (arthrocentesis). Using a microscope, gout is confirmed when uric acid crystals are seen in the joint fluid.  TREATMENT   There are two phases to gout treatment: treating the sudden onset (acute) attack and preventing attacks (prophylaxis).  · Treatment of an Acute Attack.    Medicines are used. These include anti-inflammatory medicines or steroid medicines.    An injection of steroid medicine into the affected joint is sometimes necessary.    The painful joint is rested. Movement can worsen the arthritis.    You may use warm or cold treatments on painful joints, depending which works best for you.    Treatment to Prevent Attacks.    If you suffer from frequent gout attacks, your caregiver may advise preventive medicine. These medicines are started after the acute  attack subsides. These medicines either help your kidneys eliminate uric acid from your body or decrease your uric acid production. You may need to stay on these medicines for a very long time.    The early phase of treatment with preventive medicine can be associated with an increase in acute gout attacks. For this reason, during the first few months of treatment, your caregiver may also advise you to take medicines usually used for acute gout treatment. Be sure you understand your caregiver's directions. Your caregiver may make several adjustments to your medicine dose before these medicines are effective.    Discuss dietary treatment with your caregiver or dietitian. Alcohol and drinks high in sugar and fructose and foods such as meat, poultry, and seafood can increase uric acid levels. Your caregiver or dietitian can advise you on drinks and foods that should be limited.  HOME CARE INSTRUCTIONS   · Do not take aspirin to relieve pain. This raises uric acid levels.  · Only take over-the-counter or prescription medicines for pain, discomfort, or fever as directed by your caregiver.  · Rest the joint as much as possible. When in bed, keep sheets and blankets off painful areas.  · Keep the affected joint raised (elevated).  · Apply warm or cold treatments to painful joints. Use of warm or cold treatments depends on which works best for you.  · Use crutches if the painful joint is in your leg.  · Drink enough fluids to keep your urine clear or pale yellow. This helps your body get rid of uric acid. Limit alcohol, sugary drinks, and fructose drinks.  · Follow your dietary instructions. Pay careful attention to the amount of protein you eat. Your daily diet should emphasize fruits, vegetables, whole grains, and fat-free or low-fat milk products. Discuss the use of coffee, vitamin C, and cherries with your caregiver or dietitian. These may be helpful in lowering uric acid levels.  · Maintain a healthy body weight.  SEEK  MEDICAL CARE IF:   · You develop diarrhea, vomiting, or any side effects from medicines.  · You do not feel better in 24 hours, or you are getting worse.  SEEK IMMEDIATE MEDICAL CARE IF:   · Your joint becomes suddenly more tender, and you have chills or a fever.  MAKE SURE YOU:   · Understand these instructions.  · Will watch your condition.  · Will get help right away if you are not doing well or get worse.     This information is not intended to replace advice given to you by your health care provider. Make sure you discuss any questions you have with your health care provider.     Document Released: 12/15/2001 Document Revised: 01/08/2016 Document Reviewed: 07/31/2013  [employer] Interactive Patient Education ©2016 [employer]            SYMPTOMS OF A STROKE    Call 911 or have someone take you to the Emergency Department if you have any of the following:    · Sudden numbness or weakness of your face, arm or leg especially on one side of the body  · Sudden confusion, diffiiculty speaking or trouble understanding   · Changes in your vision or loss of sight in one eye  · Sudden severe headache with no known cause  · sudden dizziness, trouble walking, loss of balance or coordination    It is important to seek emergency care right away if you have further stroke symptoms. If you get emergency help quickly, the powerful clot-dissolving medicines can reduce the disabilities caused by a stroke.     For more information:    American Stroke Association  8-959-1-STROKE  www.strokeassociation.org           IF YOU SMOKE OR USE TOBACCO PLEASE READ THE FOLLOWING:    Why is smoking bad for me?  Smoking increases the risk of heart disease, lung disease, vascular disease, stroke, and cancer.     If you smoke, STOP!    If you would like more information on quitting smoking, please visit the [employer] website: [email or web address]   This link will provide additional resources  including the QUIT line and the [employer] support groups.     For more information:    American Cancer Society  ([phone] 859-4309    American Heart Association  1-468.440.9796               YOU ARE THE MOST IMPORTANT FACTOR IN YOUR RECOVERY.     Follow all instructions carefully.     I have reviewed my discharge instructions with my nurse, including the following information, if applicable:     Information about my illness and diagnosis   Follow up appointments (including lab draws)   Wound Care   Equipment Needs   Medications (new and continuing) along with side effects   Preventative information such as vaccines and smoking cessations   Diet   Pain   I know when to contact my Doctor's office or seek emergency care      I want my nurse to describe the side effects of my medications: YES NO   If the answer is no, I understand the side effects of my medications: YES NO   My nurse described the side effects of my medications in a way that I could understand: YES NO   I have taken my personal belongings and my own medications with me at discharge: YES NO            I have received this information and my questions have been answered. I have discussed any concerns I see with this plan with the nurse or physician. I understand these instructions.    Signature of Patient or Responsible Person: _____________________________________    Date: _________________  Time: __________________    Signature of Healthcare Provider: _______________________________________  Date: _________________  Time: __________________

## 2017-01-12 ENCOUNTER — APPOINTMENT (OUTPATIENT)
Dept: GENERAL RADIOLOGY | Facility: HOSPITAL | Age: 52
End: 2017-01-12

## 2017-01-12 PROBLEM — M13.0 POLYARTICULAR ARTHRITIS: Status: ACTIVE | Noted: 2017-01-12

## 2017-01-12 LAB
ALBUMIN SERPL-MCNC: 3.2 G/DL (ref 3.5–5.2)
ALBUMIN/GLOB SERPL: 0.7 G/DL
ALP SERPL-CCNC: 210 U/L (ref 39–117)
ALT SERPL W P-5'-P-CCNC: 80 U/L (ref 1–41)
ANION GAP SERPL CALCULATED.3IONS-SCNC: 12.4 MMOL/L
AST SERPL-CCNC: 82 U/L (ref 1–40)
BASOPHILS # BLD AUTO: 0.03 10*3/MM3 (ref 0–0.2)
BASOPHILS NFR BLD AUTO: 0.5 % (ref 0–1.5)
BILIRUB SERPL-MCNC: 0.7 MG/DL (ref 0.1–1.2)
BUN BLD-MCNC: 43 MG/DL (ref 6–20)
BUN/CREAT SERPL: 28.7 (ref 7–25)
CALCIUM SPEC-SCNC: 9.3 MG/DL (ref 8.6–10.5)
CHLORIDE SERPL-SCNC: 99 MMOL/L (ref 98–107)
CHROMATIN AB SERPL-ACNC: 11.3 IU/ML (ref 0–14)
CO2 SERPL-SCNC: 26.6 MMOL/L (ref 22–29)
CREAT BLD-MCNC: 1.5 MG/DL (ref 0.76–1.27)
CRP SERPL-MCNC: 8.96 MG/DL (ref 0–0.5)
DEPRECATED RDW RBC AUTO: 43.2 FL (ref 37–54)
EOSINOPHIL # BLD AUTO: 0.04 10*3/MM3 (ref 0–0.7)
EOSINOPHIL NFR BLD AUTO: 0.6 % (ref 0.3–6.2)
ERYTHROCYTE [DISTWIDTH] IN BLOOD BY AUTOMATED COUNT: 13.1 % (ref 11.5–14.5)
ERYTHROCYTE [SEDIMENTATION RATE] IN BLOOD: 73 MM/HR (ref 0–15)
GFR SERPL CREATININE-BSD FRML MDRD: 50 ML/MIN/1.73
GLOBULIN UR ELPH-MCNC: 4.4 GM/DL
GLUCOSE BLD-MCNC: 152 MG/DL (ref 65–99)
GLUCOSE BLDC GLUCOMTR-MCNC: 140 MG/DL (ref 70–130)
GLUCOSE BLDC GLUCOMTR-MCNC: 146 MG/DL (ref 70–130)
GLUCOSE BLDC GLUCOMTR-MCNC: 184 MG/DL (ref 70–130)
HCT VFR BLD AUTO: 41.2 % (ref 40.4–52.2)
HGB BLD-MCNC: 12.9 G/DL (ref 13.7–17.6)
IMM GRANULOCYTES # BLD: 0 10*3/MM3 (ref 0–0.03)
IMM GRANULOCYTES NFR BLD: 0 % (ref 0–0.5)
LYMPHOCYTES # BLD AUTO: 0.75 10*3/MM3 (ref 0.9–4.8)
LYMPHOCYTES NFR BLD AUTO: 11.8 % (ref 19.6–45.3)
MCH RBC QN AUTO: 28.5 PG (ref 27–32.7)
MCHC RBC AUTO-ENTMCNC: 31.3 G/DL (ref 32.6–36.4)
MCV RBC AUTO: 90.9 FL (ref 79.8–96.2)
MONOCYTES # BLD AUTO: 0.14 10*3/MM3 (ref 0.2–1.2)
MONOCYTES NFR BLD AUTO: 2.2 % (ref 5–12)
NEUTROPHILS # BLD AUTO: 5.41 10*3/MM3 (ref 1.9–8.1)
NEUTROPHILS NFR BLD AUTO: 84.9 % (ref 42.7–76)
PLATELET # BLD AUTO: 273 10*3/MM3 (ref 140–500)
PMV BLD AUTO: 10.8 FL (ref 6–12)
POTASSIUM BLD-SCNC: 4.6 MMOL/L (ref 3.5–5.2)
PROT SERPL-MCNC: 7.6 G/DL (ref 6–8.5)
RBC # BLD AUTO: 4.53 10*6/MM3 (ref 4.6–6)
SODIUM BLD-SCNC: 138 MMOL/L (ref 136–145)
URATE SERPL-MCNC: 11.1 MG/DL (ref 3.4–7)
WBC NRBC COR # BLD: 6.37 10*3/MM3 (ref 4.5–10.7)

## 2017-01-12 PROCEDURE — 84550 ASSAY OF BLOOD/URIC ACID: CPT | Performed by: HOSPITALIST

## 2017-01-12 PROCEDURE — 85652 RBC SED RATE AUTOMATED: CPT | Performed by: HOSPITALIST

## 2017-01-12 PROCEDURE — 86200 CCP ANTIBODY: CPT | Performed by: HOSPITALIST

## 2017-01-12 PROCEDURE — 85025 COMPLETE CBC W/AUTO DIFF WBC: CPT | Performed by: INTERNAL MEDICINE

## 2017-01-12 PROCEDURE — 86235 NUCLEAR ANTIGEN ANTIBODY: CPT | Performed by: HOSPITALIST

## 2017-01-12 PROCEDURE — 25010000002 METHYLPREDNISOLONE PER 125 MG

## 2017-01-12 PROCEDURE — 97110 THERAPEUTIC EXERCISES: CPT

## 2017-01-12 PROCEDURE — 86140 C-REACTIVE PROTEIN: CPT | Performed by: ORTHOPAEDIC SURGERY

## 2017-01-12 PROCEDURE — 80053 COMPREHEN METABOLIC PANEL: CPT | Performed by: INTERNAL MEDICINE

## 2017-01-12 PROCEDURE — 82962 GLUCOSE BLOOD TEST: CPT

## 2017-01-12 PROCEDURE — 97162 PT EVAL MOD COMPLEX 30 MIN: CPT

## 2017-01-12 PROCEDURE — 86431 RHEUMATOID FACTOR QUANT: CPT | Performed by: HOSPITALIST

## 2017-01-12 PROCEDURE — 63710000001 PREDNISONE PER 5 MG: Performed by: INTERNAL MEDICINE

## 2017-01-12 PROCEDURE — 25010000002 MORPHINE PER 10 MG: Performed by: EMERGENCY MEDICINE

## 2017-01-12 PROCEDURE — 25010000002 ONDANSETRON PER 1 MG: Performed by: EMERGENCY MEDICINE

## 2017-01-12 PROCEDURE — 73610 X-RAY EXAM OF ANKLE: CPT

## 2017-01-12 PROCEDURE — 99204 OFFICE O/P NEW MOD 45 MIN: CPT | Performed by: INTERNAL MEDICINE

## 2017-01-12 PROCEDURE — 25010000002 ENOXAPARIN PER 10 MG

## 2017-01-12 PROCEDURE — 63710000001 INSULIN ASPART PER 5 UNITS: Performed by: HOSPITALIST

## 2017-01-12 PROCEDURE — 86225 DNA ANTIBODY NATIVE: CPT | Performed by: HOSPITALIST

## 2017-01-12 RX ORDER — COLCHICINE 0.6 MG/1
1.2 TABLET ORAL DAILY
Status: DISCONTINUED | OUTPATIENT
Start: 2017-01-12 | End: 2017-01-12

## 2017-01-12 RX ORDER — ACETAMINOPHEN 325 MG/1
650 TABLET ORAL EVERY 4 HOURS PRN
Status: DISCONTINUED | OUTPATIENT
Start: 2017-01-12 | End: 2017-01-13 | Stop reason: HOSPADM

## 2017-01-12 RX ORDER — METHYLPREDNISOLONE SODIUM SUCCINATE 125 MG/2ML
125 INJECTION, POWDER, LYOPHILIZED, FOR SOLUTION INTRAMUSCULAR; INTRAVENOUS ONCE
Status: COMPLETED | OUTPATIENT
Start: 2017-01-12 | End: 2017-01-12

## 2017-01-12 RX ORDER — DEXTROSE, SODIUM CHLORIDE, AND POTASSIUM CHLORIDE 5; .45; .15 G/100ML; G/100ML; G/100ML
100 INJECTION INTRAVENOUS CONTINUOUS
Status: DISCONTINUED | OUTPATIENT
Start: 2017-01-12 | End: 2017-01-12

## 2017-01-12 RX ORDER — PANTOPRAZOLE SODIUM 40 MG/1
40 TABLET, DELAYED RELEASE ORAL
Status: DISCONTINUED | OUTPATIENT
Start: 2017-01-12 | End: 2017-01-13 | Stop reason: HOSPADM

## 2017-01-12 RX ORDER — DEXTROSE MONOHYDRATE 25 G/50ML
25 INJECTION, SOLUTION INTRAVENOUS AS NEEDED
Status: DISCONTINUED | OUTPATIENT
Start: 2017-01-12 | End: 2017-01-13 | Stop reason: HOSPADM

## 2017-01-12 RX ORDER — COLCHICINE 0.6 MG/1
0.6 TABLET ORAL DAILY
Status: DISCONTINUED | OUTPATIENT
Start: 2017-01-12 | End: 2017-01-12

## 2017-01-12 RX ORDER — SODIUM CHLORIDE 0.9 % (FLUSH) 0.9 %
1-10 SYRINGE (ML) INJECTION AS NEEDED
Status: DISCONTINUED | OUTPATIENT
Start: 2017-01-12 | End: 2017-01-13 | Stop reason: HOSPADM

## 2017-01-12 RX ORDER — MORPHINE SULFATE 2 MG/ML
1 INJECTION, SOLUTION INTRAMUSCULAR; INTRAVENOUS EVERY 4 HOURS PRN
Status: DISCONTINUED | OUTPATIENT
Start: 2017-01-12 | End: 2017-01-12

## 2017-01-12 RX ORDER — ATENOLOL AND CHLORTHALIDONE TABLET 50; 25 MG/1; MG/1
1 TABLET ORAL DAILY
Status: DISCONTINUED | OUTPATIENT
Start: 2017-01-12 | End: 2017-01-12

## 2017-01-12 RX ORDER — ONDANSETRON 2 MG/ML
4 INJECTION INTRAMUSCULAR; INTRAVENOUS ONCE
Status: COMPLETED | OUTPATIENT
Start: 2017-01-12 | End: 2017-01-12

## 2017-01-12 RX ORDER — METHYLPREDNISOLONE SODIUM SUCCINATE 125 MG/2ML
INJECTION, POWDER, LYOPHILIZED, FOR SOLUTION INTRAMUSCULAR; INTRAVENOUS
Status: COMPLETED
Start: 2017-01-12 | End: 2017-01-12

## 2017-01-12 RX ORDER — HYDROMORPHONE HYDROCHLORIDE 1 MG/ML
0.5 INJECTION, SOLUTION INTRAMUSCULAR; INTRAVENOUS; SUBCUTANEOUS
Status: DISCONTINUED | OUTPATIENT
Start: 2017-01-12 | End: 2017-01-12

## 2017-01-12 RX ORDER — COLCHICINE 0.6 MG/1
0.6 TABLET ORAL EVERY 12 HOURS SCHEDULED
Status: DISCONTINUED | OUTPATIENT
Start: 2017-01-12 | End: 2017-01-13 | Stop reason: HOSPADM

## 2017-01-12 RX ORDER — ATENOLOL 50 MG/1
50 TABLET ORAL
Status: DISCONTINUED | OUTPATIENT
Start: 2017-01-13 | End: 2017-01-13 | Stop reason: HOSPADM

## 2017-01-12 RX ORDER — SODIUM CHLORIDE 9 MG/ML
100 INJECTION, SOLUTION INTRAVENOUS CONTINUOUS
Status: DISCONTINUED | OUTPATIENT
Start: 2017-01-12 | End: 2017-01-12

## 2017-01-12 RX ORDER — ONDANSETRON 2 MG/ML
4 INJECTION INTRAMUSCULAR; INTRAVENOUS EVERY 6 HOURS PRN
Status: DISCONTINUED | OUTPATIENT
Start: 2017-01-12 | End: 2017-01-13 | Stop reason: HOSPADM

## 2017-01-12 RX ORDER — NICOTINE POLACRILEX 4 MG
15 LOZENGE BUCCAL AS NEEDED
Status: DISCONTINUED | OUTPATIENT
Start: 2017-01-12 | End: 2017-01-13 | Stop reason: HOSPADM

## 2017-01-12 RX ORDER — ONDANSETRON 4 MG/1
4 TABLET, FILM COATED ORAL EVERY 6 HOURS PRN
Status: DISCONTINUED | OUTPATIENT
Start: 2017-01-12 | End: 2017-01-13 | Stop reason: HOSPADM

## 2017-01-12 RX ORDER — OXYCODONE AND ACETAMINOPHEN 10; 325 MG/1; MG/1
1 TABLET ORAL
Status: DISCONTINUED | OUTPATIENT
Start: 2017-01-12 | End: 2017-01-12

## 2017-01-12 RX ORDER — SODIUM CHLORIDE 9 MG/ML
75 INJECTION, SOLUTION INTRAVENOUS CONTINUOUS
Status: DISCONTINUED | OUTPATIENT
Start: 2017-01-12 | End: 2017-01-13 | Stop reason: HOSPADM

## 2017-01-12 RX ORDER — HYDROCODONE BITARTRATE AND ACETAMINOPHEN 5; 325 MG/1; MG/1
1 TABLET ORAL EVERY 4 HOURS PRN
Status: DISCONTINUED | OUTPATIENT
Start: 2017-01-12 | End: 2017-01-13 | Stop reason: HOSPADM

## 2017-01-12 RX ORDER — ONDANSETRON 4 MG/1
4 TABLET, ORALLY DISINTEGRATING ORAL EVERY 6 HOURS PRN
Status: DISCONTINUED | OUTPATIENT
Start: 2017-01-12 | End: 2017-01-13 | Stop reason: HOSPADM

## 2017-01-12 RX ORDER — PREDNISONE 20 MG/1
40 TABLET ORAL
Status: DISCONTINUED | OUTPATIENT
Start: 2017-01-12 | End: 2017-01-13 | Stop reason: HOSPADM

## 2017-01-12 RX ORDER — NALOXONE HCL 0.4 MG/ML
0.4 VIAL (ML) INJECTION
Status: DISCONTINUED | OUTPATIENT
Start: 2017-01-12 | End: 2017-01-12

## 2017-01-12 RX ORDER — ZOLPIDEM TARTRATE 10 MG/1
10 TABLET ORAL NIGHTLY PRN
Status: DISCONTINUED | OUTPATIENT
Start: 2017-01-12 | End: 2017-01-13 | Stop reason: HOSPADM

## 2017-01-12 RX ORDER — COLCHICINE 0.6 MG/1
1.2 TABLET ORAL ONCE
Status: COMPLETED | OUTPATIENT
Start: 2017-01-12 | End: 2017-01-12

## 2017-01-12 RX ADMIN — METHYLPREDNISOLONE SODIUM SUCCINATE 125 MG: 125 INJECTION, POWDER, FOR SOLUTION INTRAMUSCULAR; INTRAVENOUS at 03:07

## 2017-01-12 RX ADMIN — ENOXAPARIN SODIUM 40 MG: 40 INJECTION SUBCUTANEOUS at 06:35

## 2017-01-12 RX ADMIN — INSULIN ASPART 2 UNITS: 100 INJECTION, SOLUTION INTRAVENOUS; SUBCUTANEOUS at 12:02

## 2017-01-12 RX ADMIN — HYDROCODONE BITARTRATE AND ACETAMINOPHEN 1 TABLET: 5; 325 TABLET ORAL at 04:56

## 2017-01-12 RX ADMIN — COLCHICINE 1.2 MG: 0.6 TABLET, FILM COATED ORAL at 12:01

## 2017-01-12 RX ADMIN — MORPHINE SULFATE 4 MG: 4 INJECTION, SOLUTION INTRAMUSCULAR; INTRAVENOUS at 01:30

## 2017-01-12 RX ADMIN — COLCHICINE 0.6 MG: 0.6 TABLET, FILM COATED ORAL at 20:05

## 2017-01-12 RX ADMIN — METHYLPREDNISOLONE SODIUM SUCCINATE 125 MG: 125 INJECTION, POWDER, LYOPHILIZED, FOR SOLUTION INTRAMUSCULAR; INTRAVENOUS at 03:07

## 2017-01-12 RX ADMIN — PANTOPRAZOLE SODIUM 40 MG: 40 TABLET, DELAYED RELEASE ORAL at 06:33

## 2017-01-12 RX ADMIN — ONDANSETRON 4 MG: 2 INJECTION INTRAMUSCULAR; INTRAVENOUS at 01:31

## 2017-01-12 RX ADMIN — PREDNISONE 40 MG: 20 TABLET ORAL at 12:02

## 2017-01-12 RX ADMIN — SODIUM CHLORIDE 75 ML/HR: 9 INJECTION, SOLUTION INTRAVENOUS at 12:02

## 2017-01-12 RX ADMIN — SODIUM CHLORIDE 125 ML/HR: 9 INJECTION, SOLUTION INTRAVENOUS at 01:29

## 2017-01-12 NOTE — ED PROVIDER NOTES
" EMERGENCY DEPARTMENT ENCOUNTER    CHIEF COMPLAINT  Chief Complaint: Arthralgias  History given by: Patient, Spouse  History limited by: N/A  Room Number: 16/16  PMD: Kelsey Foy MD      HPI:  Pt reports that on 12/22/16, pt developed right shoulder pain for which pt was seen at an [facility] and was prescribed diclofenac for biceps tendinitis. Several days later, pt developed pain to the left shoulder. Pt had a C-Spine X-Ray performed for this at his PMD's office. About 9 days ago, pt developed pain to the right knee with swelling for which he was seen in the ER on 01/07/17 and had an arthrocentesis performed. Pt has also had bilateral ankle pain (left > right). Pt states that his pain remains unchanged from initial onset. Pt has taken ibuprofen with no sx relief.  No known tick bites. There are no other complaints at this time.       Pain Location: Shoulders bilaterally, right knee, bilateral ankles   Radiation: N/A  Quality: \"painful\"  Intensity/Severity: Moderate  Duration: Onset on 12/22/16  Onset quality: Gradual  Timing: Intermittent   Progression: Unchanged  Aggravating Factors: Movement of the shoulders, right knee, and ankles   Alleviating Factors: Resting the shoulders, right knee, and ankles  Previous Episodes: None  Treatment before arrival: Ibuprofen  Associated Symptoms: Swelling to the right knee       PAST MEDICAL HISTORY  Active Ambulatory Problems     Diagnosis Date Noted   • No Active Ambulatory Problems     Resolved Ambulatory Problems     Diagnosis Date Noted   • No Resolved Ambulatory Problems     Past Medical History   Diagnosis Date   • GERD (gastroesophageal reflux disease)    • Gout    • Hyperlipidemia    • Hypertension    • Kidney stone          PAST SURGICAL HISTORY  Past Surgical History   Procedure Laterality Date   • Cholecystectomy     • Cystoscopy w/ laser lithotripsy     • Knee arthroscopy     • Vasectomy           FAMILY HISTORY  History reviewed. No pertinent family " history.      SOCIAL HISTORY  Social History     Social History   • Marital status:      Spouse name: N/A   • Number of children: N/A   • Years of education: N/A     Occupational History   • Not on file.     Social History Main Topics   • Smoking status: Never Smoker   • Smokeless tobacco: Not on file   • Alcohol use No   • Drug use: No   • Sexual activity: Defer     Other Topics Concern   • Not on file     Social History Narrative   • No narrative on file         ALLERGIES  Sulfa antibiotics        REVIEW OF SYSTEMS  Review of Systems   Constitutional: Negative for chills and fatigue.   HENT: Negative for congestion, rhinorrhea and sore throat.    Eyes: Negative for pain.   Respiratory: Negative for cough, shortness of breath and wheezing.    Cardiovascular: Negative for chest pain, palpitations and leg swelling.   Gastrointestinal: Negative for abdominal pain, diarrhea, nausea and vomiting.   Genitourinary: Negative for difficulty urinating, dysuria, flank pain and frequency.   Musculoskeletal: Positive for arthralgias (to the shoulders bilaterally, to the right knee, and bilateral ankles (left > right)) and joint swelling (to the right knee). Negative for neck pain and neck stiffness.   Skin: Negative for rash.   Neurological: Negative for dizziness, speech difficulty, weakness, light-headedness, numbness and headaches.   Psychiatric/Behavioral: Negative.    All other systems reviewed and are negative.           PHYSICAL EXAM  ED Triage Vitals   Temp Heart Rate Resp BP SpO2   01/11/17 1937 01/11/17 1937 01/11/17 1937 01/11/17 1954 01/11/17 1937   98.4 °F (36.9 °C) 74 16 132/82 100 % WNL      Temp src Heart Rate Source Patient Position BP Location FiO2 (%)   01/11/17 1937 01/11/17 1937 01/11/17 1954 01/11/17 1954 --   Tympanic Monitor Sitting Right arm        Physical Exam   Constitutional: He is oriented to person, place, and time. No distress.   HENT:   Head: Normocephalic.   Mouth/Throat: Mucous  membranes are normal.   Eyes: EOM are normal. Pupils are equal, round, and reactive to light.   Neck: Normal range of motion. Neck supple.   Cardiovascular: Normal rate, regular rhythm and normal heart sounds.    Pulmonary/Chest: Effort normal and breath sounds normal. No respiratory distress. He has no decreased breath sounds. He has no wheezes. He has no rhonchi. He has no rales.   Abdominal: Soft. There is no tenderness. There is no rebound and no guarding.   Musculoskeletal: He exhibits edema (there is swelling to the ankles bilaterally) and tenderness (there is tenderness to the right knee and ankles bilaterally).   Right knee effusion   Neurological: He is alert and oriented to person, place, and time. He has normal sensation and normal strength.   Skin: Skin is warm and dry.   Psychiatric: Mood and affect normal.   Nursing note and vitals reviewed.          LAB RESULTS  Recent Results (from the past 24 hour(s))   Comprehensive Metabolic Panel    Collection Time: 01/11/17  8:42 PM   Result Value Ref Range    Glucose 127 (H) 65 - 99 mg/dL    BUN 46 (H) 6 - 20 mg/dL    Creatinine 1.73 (H) 0.76 - 1.27 mg/dL    Sodium 138 136 - 145 mmol/L    Potassium 4.8 3.5 - 5.2 mmol/L    Chloride 96 (L) 98 - 107 mmol/L    CO2 29.1 (H) 22.0 - 29.0 mmol/L    Calcium 9.7 8.6 - 10.5 mg/dL    Total Protein 8.4 6.0 - 8.5 g/dL    Albumin 3.70 3.50 - 5.20 g/dL    ALT (SGPT) 48 (H) 1 - 41 U/L    AST (SGOT) 28 1 - 40 U/L    Alkaline Phosphatase 162 (H) 39 - 117 U/L    Total Bilirubin 0.7 0.1 - 1.2 mg/dL    eGFR Non African Amer 42 (L) >60 mL/min/1.73    Globulin 4.7 gm/dL    A/G Ratio 0.8 g/dL    BUN/Creatinine Ratio 26.6 (H) 7.0 - 25.0    Anion Gap 12.9 mmol/L   Uric Acid    Collection Time: 01/11/17  8:42 PM   Result Value Ref Range    Uric Acid 12.2 (H) 3.4 - 7.0 mg/dL   CBC Auto Differential    Collection Time: 01/11/17  8:42 PM   Result Value Ref Range    WBC 12.05 (H) 4.50 - 10.70 10*3/mm3    RBC 4.68 4.60 - 6.00 10*6/mm3     Hemoglobin 13.3 (L) 13.7 - 17.6 g/dL    Hematocrit 43.1 40.4 - 52.2 %    MCV 92.1 79.8 - 96.2 fL    MCH 28.4 27.0 - 32.7 pg    MCHC 30.9 (L) 32.6 - 36.4 g/dL    RDW 13.2 11.5 - 14.5 %    RDW-SD 44.2 37.0 - 54.0 fl    MPV 10.7 6.0 - 12.0 fL    Platelets 367 140 - 500 10*3/mm3    Neutrophil % 74.4 42.7 - 76.0 %    Lymphocyte % 15.0 (L) 19.6 - 45.3 %    Monocyte % 7.5 5.0 - 12.0 %    Eosinophil % 1.9 0.3 - 6.2 %    Basophil % 0.9 0.0 - 1.5 %    Immature Grans % 0.3 0.0 - 0.5 %    Neutrophils, Absolute 8.96 (H) 1.90 - 8.10 10*3/mm3    Lymphocytes, Absolute 1.81 0.90 - 4.80 10*3/mm3    Monocytes, Absolute 0.90 0.20 - 1.20 10*3/mm3    Eosinophils, Absolute 0.23 0.00 - 0.70 10*3/mm3    Basophils, Absolute 0.11 0.00 - 0.20 10*3/mm3    Immature Grans, Absolute 0.04 (H) 0.00 - 0.03 10*3/mm3   C-reactive Protein    Collection Time: 01/11/17  8:42 PM   Result Value Ref Range    C-Reactive Protein 11.33 (H) 0.00 - 0.50 mg/dL   Sedimentation Rate    Collection Time: 01/11/17  8:42 PM   Result Value Ref Range    Sed Rate 44 (H) 0 - 15 mm/hr   Procalcitonin    Collection Time: 01/11/17  8:42 PM   Result Value Ref Range    Procalcitonin 0.17 0.10 - 0.25 ng/mL   Lactic Acid, Plasma    Collection Time: 01/11/17 11:14 PM   Result Value Ref Range    Lactate 0.8 0.5 - 2.0 mmol/L       Ordered the above labs and reviewed the results.        PROCEDURES  Procedures        PROGRESS AND CONSULTS  ED Course     10:55 PM: Blood work, procalcitonin, lactic acid, CRP, and sed rate ordered for further evaluation. IV fluids ordered to hydrate pt.     12:54 AM: Discussed case with Dr. Colbert, hospitalist. He will admit pt to a med/surg bed. Decision time to admit: now.     1:02 AM: Rechecked pt. Pt is resting comfortably and appears in no acute distress. Informed pt that his CRP is 11.33. Sed rate is 44. I have discussed case with Dr. Colbert, hospitalist, who has admitted pt for further evaluation. Pt agrees with plan.     1:16 AM: Ordered  morphine and zofran to treat for arthralgias.           MEDICAL DECISION MAKING    MDM  Number of Diagnoses or Management Options     Amount and/or Complexity of Data Reviewed  Clinical lab tests: ordered and reviewed (Sed rate is 44. CRP is 11.33. )  Decide to obtain previous medical records or to obtain history from someone other than the patient: yes  Discuss the patient with other providers: yes (Case d/w Dr. Colbert, hospitalist, who will admit pt to a med/surg bed.   )    Patient Progress  Patient progress: stable             DIAGNOSIS  Final diagnoses:   Polyarticular arthritis   Renal insufficiency         DISPOSITION  Pt admitted to med/surg.    ADMISSION    Discussed treatment plan and reason for admission with pt/family and admitting physician.  Pt/family voiced understanding of the plan for admission for further testing/treatment as needed.       Latest Documented Vital Signs:  As of 12:59 AM  BP- 116/83 HR- 71 Temp- 98.4 °F (36.9 °C) (Tympanic) O2 sat- 95%        --  Documentation assistance provided by kristie Holloway for Dr. Cedric MD.  Information recorded by the scrjose carlose was done at my direction and has been verified and validated by me.                       Lei Holloway  01/12/17 0136       Remberto Steele MD  01/12/17 [id number]

## 2017-01-12 NOTE — CONSULTS
"      ORTHOPEDIC SURGERY CONSULT      Patient: Farhan Ralph  Date of Admission: 1/11/2017 10:06 PM  YOB: 1965  Medical Record Number: [id number]  Attending Physician: Stan Nunez MD  Consulting Physician: Saqib Sweeney MD    CHIEF COMPLIANT: Bilateral shoulder pain, right knee pain and bilateral ankle pain.    HISTORY OF PRESENT ILLINESS: Patient is a 51 y.o. year old male presents to [facility] with above complaints.  I was consulted for further evaluation and treatment.  Patient is not a good historian, but history was obtained from him and chart review.  Patient has history of gout and began having bilateral shoulder pain on dec 23,2016.  He was seen in an urgent care center and was diagnosed with bicipital tendonits.  Was given NSAID.  Eventually pain did improve and was able to return back to work for a few days.  Then pain returned in shoulder and then spread to right knee and eventually both ankles.  Was unable to ambulate.  He usually takes colchicine at home when it \"gets bad\" but he couldn't walk to bathroom and he knows it causes diarhea so he didn't take it.  Has been on oral steroids which he reports didn't really seem to help.  Was seen at [facility] ER where right knee was aspirated on Saturday which showed uric acid crystals.  Was readmitted for worsening symptoms.      ALLERGIES:   Allergies   Allergen Reactions   • Sulfa Antibiotics Other (See Comments)     UNSURE  Pt states unknown reaction, that his mother always told him that he was allergic.       HOME MEDICATIONS:    (Not in a hospital admission)    CURRENT MEDICATIONS:  Scheduled Meds:  atenolol-chlorthalidone 1 tablet Oral Daily   colchicine 0.6 mg Oral Daily   colchicine 1.2 mg Oral Daily   enoxaparin 40 mg Subcutaneous Q24H   pantoprazole 40 mg Oral Q AM   predniSONE 40 mg Oral Daily With Breakfast     Continuous Infusions:  sodium chloride 125 mL/hr Last Rate: Stopped (01/12/17 0301)   sodium chloride " 100 mL/hr Last Rate: 100 mL/hr (01/12/17 0303)     PRN Meds:.•  acetaminophen  •  HYDROcodone-acetaminophen  •  Morphine **AND** naloxone  •  sodium chloride  •  Insert peripheral IV **AND** sodium chloride  •  zolpidem    Past Medical History   Diagnosis Date   • GERD (gastroesophageal reflux disease)    • Gout    • Hyperlipidemia    • Hypertension    • Kidney stone      Past Surgical History   Procedure Laterality Date   • Cholecystectomy     • Cystoscopy w/ laser lithotripsy     • Knee arthroscopy     • Vasectomy       Social History     Occupational History   • Not on file.     Social History Main Topics   • Smoking status: Never Smoker   • Smokeless tobacco: Not on file   • Alcohol use No   • Drug use: No   • Sexual activity: Defer    Social History     Social History Narrative   • No narrative on file     History reviewed. No pertinent family history.    REVIEW OF SYSTEMS:    HEENT: Patient denies any headaches, vision changes, change in hearing, or tinnitus, Patient denies epistaxis, sinus pain, hoarseness, or dysphagia   Pulmonary: Patient denies any cough, congestion, acute change in SOA or wheezing.   Cardiovascular: Patient denies any change in chest pain, dyspnea, palpitations, weakness, intolerance of exercise, varicosities, change in murmur   Gastrointestinal:  Patient denies change in appetite, melena, change in bowel habits.   Genital/Urinary: Patient denies dysuria, change in color of urine, change in frequency of urination, pain with urgency, change in incontinence, retention.   Musculoskeletal: Patient denies complaints of acute changes in symptoms of other joints not mentioned above.   Neurological: Patient denies changes in dizziness, tremor, ataxia, or difficulty in speaking or changes in memory.   Endocrine system: Patient denies acute changes in tremors, palpitations, polyuria, polydipsia, polyphagia, diaphoresis, exophthalmos, or goiter.   Psychological: Patient denies thoughts/plans or  harming self or other; denies acute changes in depression,  insomnia, night terrors, jayla, disorientation.   Skin: Patient denies any bruising, rashes, discoloration, pruritus,or wounds not mentioned in history of present illness or chief complaint above.   Hematopoietic: Patient denies current bleeding, epistaxis, hematuria, or melena.    PHYSICAL EXAM:   Vitals:  Vitals:    01/12/17 0313 01/12/17 0343 01/12/17 0443 01/12/17 0635   BP: 124/89 135/84 126/78 108/70   BP Location:       Patient Position:    Sitting   Pulse:    65   Resp:    18   Temp:       TempSrc:       SpO2: 95% 97% 95% 95%   Weight:       Height:           General:  51 y.o. male who appears about stated age.    Alert, cooperative, in no acute distress         Head:    Normocephalic, without obvious abnormality, atraumatic   Eyes:            Lids and lashes normal, conjunctivae and sclerae normal, no         icterus, no pallor, corneas clear, PERRLA   Ears:    Ears appear intact with no abnormalities noted   Throat:   No oral lesions, no thrush, oral mucosa moist   Neck:   No adenopathy, supple, trachea midline, no JVD   Back:     Limited exam shows no severe kyphosis present,no visible           erythema, no excessive  tenderness to palpation.    Lungs:     Respirations regular, even and unlabored.     Heart:    Normal rate, Pulses palpable   Chest Wall:    No abnormalities observed.   Abdomen:     Normal bowel sounds, no masses, no organomegaly, soft              non-tender, non-distended, no guarding, no rebound                      tenderness   Rectal:     Deferred   Pulses:   Pulses palpable and equal bilaterally   Skin:   No bleeding, bruising or rash   Lymph nodes:   No palpable adenopathy   Extremities:     Right knee skin intact.  Moderate warmth.  Painful AROM 5-60 degrees.  Ligaments stable.  NVI distally    Bilateral ankle: skin intact.  Painful ROM.  Mild warm.  Moderate swelling of bilateral feet and ankles.        DIAGNOSTIC  TEST:  Admission on 01/11/2017   Component Date Value Ref Range Status   • Glucose 01/11/2017 127* 65 - 99 mg/dL Final   • BUN 01/11/2017 46* 6 - 20 mg/dL Final   • Creatinine 01/11/2017 1.73* 0.76 - 1.27 mg/dL Final   • Sodium 01/11/2017 138  136 - 145 mmol/L Final   • Potassium 01/11/2017 4.8  3.5 - 5.2 mmol/L Final   • Chloride 01/11/2017 96* 98 - 107 mmol/L Final   • CO2 01/11/2017 29.1* 22.0 - 29.0 mmol/L Final   • Calcium 01/11/2017 9.7  8.6 - 10.5 mg/dL Final   • Total Protein 01/11/2017 8.4  6.0 - 8.5 g/dL Final   • Albumin 01/11/2017 3.70  3.50 - 5.20 g/dL Final   • ALT (SGPT) 01/11/2017 48* 1 - 41 U/L Final   • AST (SGOT) 01/11/2017 28  1 - 40 U/L Final   • Alkaline Phosphatase 01/11/2017 162* 39 - 117 U/L Final   • Total Bilirubin 01/11/2017 0.7  0.1 - 1.2 mg/dL Final   • eGFR Non African Amer 01/11/2017 42* >60 mL/min/1.73 Final   • Globulin 01/11/2017 4.7  gm/dL Final   • A/G Ratio 01/11/2017 0.8  g/dL Final   • BUN/Creatinine Ratio 01/11/2017 26.6* 7.0 - 25.0 Final   • Anion Gap 01/11/2017 12.9  mmol/L Final   • Uric Acid 01/11/2017 12.2* 3.4 - 7.0 mg/dL Final   • WBC 01/11/2017 12.05* 4.50 - 10.70 10*3/mm3 Final   • RBC 01/11/2017 4.68  4.60 - 6.00 10*6/mm3 Final   • Hemoglobin 01/11/2017 13.3* 13.7 - 17.6 g/dL Final   • Hematocrit 01/11/2017 43.1  40.4 - 52.2 % Final   • MCV 01/11/2017 92.1  79.8 - 96.2 fL Final   • MCH 01/11/2017 28.4  27.0 - 32.7 pg Final   • MCHC 01/11/2017 30.9* 32.6 - 36.4 g/dL Final   • RDW 01/11/2017 13.2  11.5 - 14.5 % Final   • RDW-SD 01/11/2017 44.2  37.0 - 54.0 fl Final   • MPV 01/11/2017 10.7  6.0 - 12.0 fL Final   • Platelets 01/11/2017 367  140 - 500 10*3/mm3 Final   • Neutrophil % 01/11/2017 74.4  42.7 - 76.0 % Final   • Lymphocyte % 01/11/2017 15.0* 19.6 - 45.3 % Final   • Monocyte % 01/11/2017 7.5  5.0 - 12.0 % Final   • Eosinophil % 01/11/2017 1.9  0.3 - 6.2 % Final   • Basophil % 01/11/2017 0.9  0.0 - 1.5 % Final   • Immature Grans % 01/11/2017 0.3  0.0 - 0.5 %  Final   • Neutrophils, Absolute 01/11/2017 8.96* 1.90 - 8.10 10*3/mm3 Final   • Lymphocytes, Absolute 01/11/2017 1.81  0.90 - 4.80 10*3/mm3 Final   • Monocytes, Absolute 01/11/2017 0.90  0.20 - 1.20 10*3/mm3 Final   • Eosinophils, Absolute 01/11/2017 0.23  0.00 - 0.70 10*3/mm3 Final   • Basophils, Absolute 01/11/2017 0.11  0.00 - 0.20 10*3/mm3 Final   • Immature Grans, Absolute 01/11/2017 0.04* 0.00 - 0.03 10*3/mm3 Final   • C-Reactive Protein 01/11/2017 11.33* 0.00 - 0.50 mg/dL Final   • Sed Rate 01/11/2017 44* 0 - 15 mm/hr Final   • Procalcitonin 01/11/2017 0.17  0.10 - 0.25 ng/mL Final   • Lactate 01/11/2017 0.8  0.5 - 2.0 mmol/L Final   • WBC 01/12/2017 6.37  4.50 - 10.70 10*3/mm3 Final   • RBC 01/12/2017 4.53* 4.60 - 6.00 10*6/mm3 Final   • Hemoglobin 01/12/2017 12.9* 13.7 - 17.6 g/dL Final   • Hematocrit 01/12/2017 41.2  40.4 - 52.2 % Final   • MCV 01/12/2017 90.9  79.8 - 96.2 fL Final   • MCH 01/12/2017 28.5  27.0 - 32.7 pg Final   • MCHC 01/12/2017 31.3* 32.6 - 36.4 g/dL Final   • RDW 01/12/2017 13.1  11.5 - 14.5 % Final   • RDW-SD 01/12/2017 43.2  37.0 - 54.0 fl Final   • MPV 01/12/2017 10.8  6.0 - 12.0 fL Final   • Platelets 01/12/2017 273  140 - 500 10*3/mm3 Final   • Neutrophil % 01/12/2017 84.9* 42.7 - 76.0 % Final   • Lymphocyte % 01/12/2017 11.8* 19.6 - 45.3 % Final   • Monocyte % 01/12/2017 2.2* 5.0 - 12.0 % Final   • Eosinophil % 01/12/2017 0.6  0.3 - 6.2 % Final   • Basophil % 01/12/2017 0.5  0.0 - 1.5 % Final   • Immature Grans % 01/12/2017 0.0  0.0 - 0.5 % Final   • Neutrophils, Absolute 01/12/2017 5.41  1.90 - 8.10 10*3/mm3 Final   • Lymphocytes, Absolute 01/12/2017 0.75* 0.90 - 4.80 10*3/mm3 Final   • Monocytes, Absolute 01/12/2017 0.14* 0.20 - 1.20 10*3/mm3 Final   • Eosinophils, Absolute 01/12/2017 0.04  0.00 - 0.70 10*3/mm3 Final   • Basophils, Absolute 01/12/2017 0.03  0.00 - 0.20 10*3/mm3 Final   • Immature Grans, Absolute 01/12/2017 0.00  0.00 - 0.03 10*3/mm3 Final   • Glucose 01/12/2017  152* 65 - 99 mg/dL Final   • BUN 01/12/2017 43* 6 - 20 mg/dL Final   • Creatinine 01/12/2017 1.50* 0.76 - 1.27 mg/dL Final   • Sodium 01/12/2017 138  136 - 145 mmol/L Final   • Potassium 01/12/2017 4.6  3.5 - 5.2 mmol/L Final   • Chloride 01/12/2017 99  98 - 107 mmol/L Final   • CO2 01/12/2017 26.6  22.0 - 29.0 mmol/L Final   • Calcium 01/12/2017 9.3  8.6 - 10.5 mg/dL Final   • Total Protein 01/12/2017 7.6  6.0 - 8.5 g/dL Final   • Albumin 01/12/2017 3.20* 3.50 - 5.20 g/dL Final   • ALT (SGPT) 01/12/2017 80* 1 - 41 U/L Final   • AST (SGOT) 01/12/2017 82* 1 - 40 U/L Final   • Alkaline Phosphatase 01/12/2017 210* 39 - 117 U/L Final   • Total Bilirubin 01/12/2017 0.7  0.1 - 1.2 mg/dL Final   • eGFR Non African Amer 01/12/2017 50* >60 mL/min/1.73 Final   • Globulin 01/12/2017 4.4  gm/dL Final   • A/G Ratio 01/12/2017 0.7  g/dL Final   • BUN/Creatinine Ratio 01/12/2017 28.7* 7.0 - 25.0 Final   • Anion Gap 01/12/2017 12.4  mmol/L Final       Xr Shoulder 2+ View Right    Result Date: 12/23/2016  Narrative: 2 RADIOGRAPHIC VIEWS OF THE RIGHT SHOULDER  CLINICAL HISTORY: No known trauma. Right shoulder pain.  FINDINGS: 2 radiographic views of the right shoulder demonstrate no evidence for acute fracture or bony malalignment. No significant osseous or soft tissue abnormality is noted.  This report was finalized on 12/23/2016 7:35 PM by Dr. Benjamin Sanderson MD.      Xr Knee 1 Or 2 View Right    Result Date: 1/8/2017  Narrative: RIGHT KNEE 2 VIEWS.  HISTORY: Knee pain, no injury.  COMPARISON: No prior studies for comparison.  FINDINGS: There is no fracture or dislocation.  Moderately large joint effusion is noted.  Soft tissue structures are unremarkable.      Impression: No acute fracture or dislocation. Moderately large knee joint effusion.  This report was finalized on 1/8/2017 11:27 PM by Dr. Carlitos Cotton MD.          ASSESSMENT:  Gout    Patient Active Problem List   Diagnosis   • Polyarticular arthritis       PLAN:    Will  start colchicine, already on iv and oral steroids.  When symptoms improve will start allopurinol.  Consult P.T.  Home when ambulatory.      The above diagnosis and treatment plan was discussed with the patient.  They were educated in treatment options for their condition.   They were given the opportunity to ask questions and were answered to their satisfaction.  They agreed to proceed with the above treatment plan.        Saqib Sweeney MD  Date: 1/12/2017

## 2017-01-12 NOTE — PROGRESS NOTES
Acute Care - Physical Therapy Initial Evaluation  [facility]     Patient Name: Farhan Ralph  : 1965  MRN: [id number]  Today's Date: 2017   Onset of Illness/Injury or Date of Surgery Date: 17     Referring Physician: Patel      Admit Date: 2017     Visit Dx:    ICD-10-CM ICD-9-CM   1. Polyarticular arthritis M13.0 716.50   2. Renal insufficiency N28.9 593.9   3. Difficulty walking R26.2 719.7     Patient Active Problem List   Diagnosis   • Polyarticular arthritis     Past Medical History   Diagnosis Date   • GERD (gastroesophageal reflux disease)    • Gout    • Hyperlipidemia    • Hypertension    • Kidney stone      Past Surgical History   Procedure Laterality Date   • Cholecystectomy     • Cystoscopy w/ laser lithotripsy     • Vasectomy     • Cyst removal       Neck          PT ASSESSMENT (last 72 hours)      PT Evaluation       17 1452 17 0908    Rehab Evaluation    Document Type evaluation  -EM     Subjective Information agree to therapy;complains of;pain  -EM     General Information    Onset of Illness/Injury or Date of Surgery Date 17  -EM     Referring Physician Patel  -EM     General Observations young  male in supine, awake and alert  -EM     Pertinent History Of Current Problem polyarticular arthritis, gout  -EM     Prior Level of Function independent:;community mobility  -EM     Equipment Currently Used at Home none  -EM     Plans/Goals Discussed With patient  -EM     Living Environment    Lives With spouse  -EM spouse  -CC    Living Arrangements house  -EM house  -CC    Home Accessibility stairs to enter home  -EM no concerns  -CC    Number of Stairs to Enter Home --   pt states 2 flights of stairs to get in   -EM     Stair Railings at Home  inside, present at both sides;outside, present at both sides  -CC    Type of Financial/Environmental Concern  none  -CC    Transportation Available  car  -CC    Clinical Impression    Patient/Family Goals  Statement decrease pain, go home  -EM     Criteria for Skilled Therapeutic Interventions Met yes;treatment indicated  -EM     Impairments Found (describe specific impairments) gait, locomotion, and balance  -EM     Rehab Potential good, to achieve stated therapy goals  -EM     Pain Assessment    Pain Assessment 0-10  -EM     Pain Score 5  -EM     Pain Location Ankle  -EM     Pain Orientation Right;Left  -EM     Pain Intervention(s) Medication (See MAR)  -EM     Cognitive Assessment/Intervention    Current Cognitive/Communication Assessment functional  -EM     Orientation Status oriented x 4  -EM     Follows Commands/Answers Questions 100% of the time  -EM     Personal Safety WNL/WFL  -EM     ROM (Range of Motion)    General ROM no range of motion deficits identified   stiffness in ankles and knees, painful with ROM   -EM     MMT (Manual Muscle Testing)    General MMT Assessment no strength deficits identified  -EM     Bed Mobility, Assessment/Treatment    Bed Mob, Supine to Sit, [place] minimum assist (75% patient effort)  -EM     Transfer Assessment/Treatment    Transfers, Sit-Stand [place] contact guard assist  -EM     Transfers, Stand-Sit [place] contact guard assist  -EM     Gait Assessment/Treatment    Gait, [place] Level contact guard assist  -EM     Gait, Assistive Device rolling walker  -EM     Gait, Distance (Feet) 50  -EM     Gait, Gait Deviations gaudencio decreased;antalgic;step length decreased  -EM     Positioning and Restraints    Pre-Treatment Position in bed  -EM     Post Treatment Position bed  -EM     In Bed sitting EOB;call light within reach;with family/caregiver;notified [facility] [place] [facility]  -EM       01/12/17 0906       General Information    Equipment Currently Used at Home crutches  -CC       User Key  (r) = Recorded By, (t) = Taken By, (c) = Cosigned By    Initials Name Provider Type    CC Megan Berry, RN Registered Nurse    EM Cass Ashley, PT Physical Therapist           Physical Therapy Education     Title: PT OT SLP Therapies (Active)     Topic: Physical Therapy (Active)     Point: Mobility training (Active)    Learning Progress Summary    Learner Readiness Method Response Comment Documented by Status   Patient Acceptance E NR  EM 01/12/17 1456 Active                      User Key     Initials Effective Dates Name Provider Type Discipline    EM 12/01/15 -  Cass Ashley, PT Physical Therapist PT                PT Recommendation and Plan  Anticipated Discharge Disposition: home with assist  Planned Therapy Interventions: gait training, home exercise program  PT Frequency: daily  Plan of Care Review  Plan Of Care Reviewed With: patient  Outcome Summary/Follow up Plan: patient presents with pain in bilateral ankles with weight bearing, limiting activity tolerance. Patient would benefit from rwx to use at home. Patient reports pain is improved from yesterday and anticipate patient will be able to return home.           IP PT Goals       01/12/17 1456          Bed Mobility PT LTG    Bed Mobility PT LTG, Date Established 01/12/17  -EM      Bed Mobility PT LTG, Time to Achieve 1 wk  -EM      Bed Mobility PT LTG, Activity Type all bed mobility  -EM      Bed Mobility PT LTG, [place] Level independent  -EM      Transfer Training PT LTG    Transfer Training PT LTG, Date Established 01/12/17  -EM      Transfer Training PT LTG, Time to Achieve 1 wk  -EM      Transfer Training PT LTG, Activity Type all transfers  -EM      Transfer Training PT LTG, [place] Level independent  -EM      Gait Training PT LTG    Gait Training Goal PT LTG, Date Established 01/12/17  -EM      Gait Training Goal PT LTG, Time to Achieve 1 wk  -EM      Gait Training Goal PT LTG, [place] Level supervision required  -EM      Gait Training Goal PT LTG, Assist Device walker, rolling  -EM      Gait Training Goal PT LTG, Distance to Achieve 150 feet   -EM        User Key  (r) = Recorded By, (t) =  Taken By, (c) = Cosigned By    Initials Name Provider Type    EM Cass Ashley PT Physical Therapist                Outcome Measures       01/12/17 1400          How much help from another person do you currently need...    Turning from your back to your side while in flat bed without using bedrails? 4  -EM      Moving from lying on back to sitting on the side of a flat bed without bedrails? 3  -EM      Moving to and from a bed to a chair (including a wheelchair)? 3  -EM      Standing up from a chair using your arms (e.g., wheelchair, bedside chair)? 3  -EM      Climbing 3-5 steps with a railing? 3  -EM      To walk in hospital room? 3  -EM      AM-PAC 6 Clicks Score 19  -EM      Functional Assessment    Outcome Measure Options AM-PAC 6 Clicks Basic Mobility (PT)  -EM        User Key  (r) = Recorded By, (t) = Taken By, (c) = Cosigned By    Initials Name Provider Type    EM Cass Ashley PT Physical Therapist           Time Calculation:         PT Charges       01/12/17 1458          Time Calculation    Start Time 1346  -EM      Stop Time 1358  -EM      Time Calculation (min) 12 min  -EM      PT Received On 01/12/17  -EM      PT - Next Appointment 01/13/17  -EM      PT Goal Re-Cert Due Date 01/19/17  -EM        User Key  (r) = Recorded By, (t) = Taken By, (c) = Cosigned By    Initials Name Provider Type    JASSI Ashley PT Physical Therapist          Therapy Charges for Today     Code Description Service Date Service Provider Modifiers Qty    27341390935 HC PT EVAL MOD COMPLEXITY 2 1/12/2017 Cass Ashley, PT GP 1    75592930658 HC PT THER PROC EA 15 MIN 1/12/2017 Cass Ashley, PT GP 1          PT G-Codes  Outcome Measure Options: AM-PAC 6 Clicks Basic Mobility (PT)      Cass Ashley, PT  1/12/2017

## 2017-01-12 NOTE — ED NOTES
Patient seen here Saturday for the same thing. Reports pain in his feet from his gout and up into his right knee. States that his kidney function was elevated.      Vilma Krishna RN  01/11/17 1945

## 2017-01-12 NOTE — PLAN OF CARE
Problem: Patient Care Overview (Adult)  Goal: Plan of Care Review    01/12/17 1456   Outcome Evaluation   Outcome Summary/Follow up Plan patient presents with pain in bilateral ankles with weight bearing, limiting activity tolerance. Patient would benefit from rwx to use at home. Patient reports pain is improved from yesterday and anticipate patient will be able to return home.          Problem: Inpatient Physical Therapy  Goal: Bed Mobility Goal LTG- PT    01/12/17 1456   Bed Mobility PT LTG   Bed Mobility PT LTG, Date Established 01/12/17   Bed Mobility PT LTG, Time to Achieve 1 wk   Bed Mobility PT LTG, Activity Type all bed mobility   Bed Mobility PT LTG, [place] Level independent       Goal: Transfer Training Goal 1 LTG- PT    01/12/17 1456   Transfer Training PT LTG   Transfer Training PT LTG, Date Established 01/12/17   Transfer Training PT LTG, Time to Achieve 1 wk   Transfer Training PT LTG, Activity Type all transfers   Transfer Training PT LTG, [place] Level independent       Goal: Gait Training Goal LTG- PT    01/12/17 1456   Gait Training PT LTG   Gait Training Goal PT LTG, Date Established 01/12/17   Gait Training Goal PT LTG, Time to Achieve 1 wk   Gait Training Goal PT LTG, [place] Level supervision required   Gait Training Goal PT LTG, Assist Device walker, rolling   Gait Training Goal PT LTG, Distance to Achieve 150 feet

## 2017-01-12 NOTE — H&P
HISTORY AND PHYSICAL   [facility]        Patient Identification:  Name: Farhan Ralph  Age: 51 y.o.  Sex: male  :  1965  MRN: [id number]                     Primary Care Physician: Kelsey Foy MD    Chief Complaint:  Joint pain    History of Present Illness:   Pleasant 51-year-old gentleman with a history of gout presenting with pain and swelling of the right knee and both ankles.  He notes that he had severe pain in this left shoulder in early December,  He was seen at an urgent care center in late Dec and was given an nonsteroidal anti-inflammatory..  The did help a good deal although he still has some shoulder discomfort.  About 9 or 10 days ago he developed fairly sudden onset of pain and swelling in the right knee.  This was followed by pain and swelling in both ankles.  This appointment was essentially nonambulatory.  He denies any fevers or sweats but states he may have felt a little chilled.  He does not recall the knee or ankles appearing erythematous or hot to touch.  He presented to the emergency room for the right knee was tapped he was started on narcotics for pain and culture seen.  He was a bit reluctant to start the culture seen as a new cause diarrhea and he had difficulty getting to the bathroom.  He presented back to the emergency room last night and was started on steroids and admitted.  He states he is feeling a little better this morning.  His previous gouty attacks have been more typical involving the toes and occasionally an ankle.  No unusual rashes.  No insect or tick bites.  Results of the tap on the previous emergency room visit did show intracellular crystals consistent with uric acid.  There is also positive culture for very light growth of staph epi.    Past Medical History:  Past Medical History   Diagnosis Date   • GERD (gastroesophageal reflux disease)    • Gout    • Hyperlipidemia    • Hypertension    • Kidney stone      Past Surgical History:  Past  Surgical History   Procedure Laterality Date   • Cholecystectomy     • Cystoscopy w/ laser lithotripsy     • Vasectomy     • Cyst removal       Neck      Home Meds:  Prescriptions Prior to Admission   Medication Sig Dispense Refill Last Dose   • ATENOLOL PO Take  by mouth.      • atenolol-chlorthalidone (TENORETIC) 50-25 MG per tablet Take 1 tablet by mouth Daily.  0 Past Week at Unknown time   • colchicine 0.6 MG tablet Take 1 tablet by mouth Daily. 1 tablet 0    • HYDROcodone-acetaminophen (NORCO) 7.5-325 MG per tablet Take 1 tablet by mouth Every 6 (Six) Hours As Needed for severe pain (7-10). 15 tablet 0    • pantoprazole (PROTONIX) 40 MG EC tablet Take 1 tablet by mouth Daily.  2    • zolpidem (AMBIEN) 10 MG tablet Take 1 tablet by mouth At Night As Needed.  0 More than a month at Unknown time   • diclofenac (VOLTAREN) 75 MG EC tablet Take 1 tablet by mouth 2 (Two) Times a Day. 15 tablet 0 Past Week at Unknown time   • indomethacin (INDOCIN) 25 MG capsule Take 1 capsule by mouth 3 (Three) Times a Day As Needed.  2    • NIACIN CR PO Take  by mouth.   Past Month at Unknown time       Allergies:  Allergies   Allergen Reactions   • Sulfa Antibiotics Other (See Comments)     UNSURE  Pt states unknown reaction, that his mother always told him that he was allergic.     Immunizations:    There is no immunization history on file for this patient.  Social History:   Social History     Social History Narrative     Social History   Substance Use Topics   • Smoking status: Never Smoker   • Smokeless tobacco: Not on file   • Alcohol use No     Family History:  History reviewed. No pertinent family history.     Review of Systems  Review of Systems   All other systems reviewed and are negative.      Objective:  tMax 24 hrs: Temp (24hrs), Av.1 °F (36.7 °C), Min:97.7 °F (36.5 °C), Max:98.4 °F (36.9 °C)    Vitals Ranges:   Temp:  [97.7 °F (36.5 °C)-98.4 °F (36.9 °C)] 97.7 °F (36.5 °C)  Heart Rate:  [58-74] 58  Resp:  [16-18]  18  BP: (108-135)/() 116/75      Exam:  Physical Exam   Constitutional: He is oriented to person, place, and time. He appears well-developed and well-nourished. No distress.   HENT:   Head: Normocephalic and atraumatic.   Right Ear: External ear normal.   Left Ear: External ear normal.   Nose: Nose normal.   Mouth/Throat: Oropharynx is clear and moist.   Eyes: Conjunctivae and EOM are normal. Right eye exhibits no discharge. Left eye exhibits no discharge. No scleral icterus.   Neck: Neck supple. No JVD present. No tracheal deviation present. No thyromegaly present.   Cardiovascular: Normal rate, regular rhythm, normal heart sounds and intact distal pulses.    Pulmonary/Chest: Effort normal and breath sounds normal. No stridor. No respiratory distress. He exhibits no tenderness.   Abdominal: Soft. Bowel sounds are normal. He exhibits no distension and no mass. There is no tenderness. There is no rebound and no guarding.   Musculoskeletal: He exhibits no edema or deformity.   There is visible swelling of the right knee.  On palpation this appears to be a joint effusion.  No appreciable tenosynovitis.  There is also some mild swelling of the ankles.  These joints are presently nontender which a patient notes is actually considerable improvement.  No erythema and no increased warmth on palpation.  There is some limitation in flexion of the right knee secondary to the effusion.   Neurological: He is alert and oriented to person, place, and time. No cranial nerve deficit. He exhibits normal muscle tone. Coordination normal.   Skin: Skin is warm and dry. He is not diaphoretic.   Psychiatric: He has a normal mood and affect. His behavior is normal. Judgment and thought content normal.       Data Review:  All labs and radiology reviewed.    Assessment:  Active Problems:    Polyarticular arthritis - Gout:  + arthrocentesis culture - Appears to be a contaminant.  Will get an ID opinion.  Hyperglycemia - Check A1c,  monitor, SSI as needed.  CKD III (?) - baseline not known.   Leukocytosis:       Plan:  Please see above.  D/C thiazide diuretics.   Will also evaluate for possible CTD.      Stan Nunez MD  1/12/2017  10:08 AM

## 2017-01-12 NOTE — CONSULTS
"Referring Provider: Enrike Nunez MD    Reason for Consultation: + knee culture    History of present illness:  Ivan is a 51 YOM with PMH of gout who I am asked to evaluate and give opinion for + knee culture. History from the patient and review of the medical records which I summarize/synthesize as follows: He tells me that he has had gout many times in the past often affecting the knees, foot, elbow, or hand. About 1 week ago he had sudden onset sharp R knee pain not relieved with indomethacin or ibuprofen. He also had B shoulder pain with associated decreased range of motion. He has never had shoulder issues before. He came to the ER on 1/7 and had an arthrocentesis as outlined below. XR showed an effusion. He was discharged on Lortab and colchicine. He returned to the ER on 1/11 with persistent pain in the knee moreso than the shoulder. He was started on IV and oral steroids along w/ colochicine and reports improvement.     He denies cellulitis overlying the knee. He was not having fevers. He thinks he did have a few chills. He has never had prior knee surgery.    Past Medical History   Diagnosis Date   • GERD (gastroesophageal reflux disease)    • Gout    • Hyperlipidemia    • Hypertension    • Kidney stone        Past Surgical History   Procedure Laterality Date   • Cholecystectomy     • Cystoscopy w/ laser lithotripsy     • Vasectomy     • Cyst removal       Neck       Social History:  Lives w/ 3 children in [place]  Works at factory [employer]  No illicits    Family History:  + family history of gout    Allergies:  Sulfa (\"I don't know what happens; my mom just told me I was\")    Medications:    Current Facility-Administered Medications:   •  acetaminophen (TYLENOL) tablet 650 mg, 650 mg, Oral, Q4H PRN, Meek Colbert MD  •  acetaminophen (TYLENOL) tablet 650 mg, 650 mg, Oral, Q4H PRN, Stan Nunez MD  •  [START ON 1/13/2017] atenolol (TENORMIN) tablet 50 mg, 50 mg, Oral, Q24H, Stan" MARITZA Nunez MD  •  colchicine tablet 0.6 mg, 0.6 mg, Oral, Q12H, Saqib Sweeney MD  •  colchicine tablet 1.2 mg, 1.2 mg, Oral, Once, Saqib Sweeney MD  •  dextrose (D50W) solution 25 g, 25 g, Intravenous, PRN, Stan Nunez MD  •  dextrose (GLUTOSE) oral gel 15 g, 15 g, Oral, PRN, Stan Nunez MD  •  glucagon (human recombinant) (GLUCAGEN DIAGNOSTIC) injection 1 mg, 1 mg, Subcutaneous, Once PRN, Stan Nunez MD  •  HYDROcodone-acetaminophen (NORCO) 5-325 MG per tablet 1 tablet, 1 tablet, Oral, Q4H PRN, Meek Colbert MD, 1 tablet at 01/12/17 0456  •  insulin aspart (novoLOG) injection 0-7 Units, 0-7 Units, Subcutaneous, 4x Daily AC & at Bedtime, Stan Nunez MD  •  ondansetron (ZOFRAN) tablet 4 mg, 4 mg, Oral, Q6H PRN **OR** ondansetron ODT (ZOFRAN-ODT) disintegrating tablet 4 mg, 4 mg, Oral, Q6H PRN **OR** ondansetron (ZOFRAN) injection 4 mg, 4 mg, Intravenous, Q6H PRN, Stan Nunez MD  •  pantoprazole (PROTONIX) EC tablet 40 mg, 40 mg, Oral, Q AM, Meek Colbert MD, 40 mg at 01/12/17 0633  •  predniSONE (DELTASONE) tablet 40 mg, 40 mg, Oral, Daily With Breakfast, Meek Colbert MD  •  sodium chloride 0.9 % flush 1-10 mL, 1-10 mL, Intravenous, PRN, Meek Colbert MD  •  sodium chloride 0.9 % flush 1-10 mL, 1-10 mL, Intravenous, PRN, Stan Nunez MD  •  Insert peripheral IV, , , Once **AND** sodium chloride 0.9 % flush 10 mL, 10 mL, Intravenous, PRN, Remberto Steele MD  •  sodium chloride 0.9 % infusion, 75 mL/hr, Intravenous, Continuous, Stan Nunez MD  •  zolpidem (AMBIEN) tablet 10 mg, 10 mg, Oral, Nightly PRN, Meek Colbert MD      Review of Systems  All systems were reviewed and are negative unless otherwise stated above in the HPI    Objective   Vital Signs   Temp:  [97.7 °F (36.5 °C)-98.4 °F (36.9 °C)] 97.7 °F (36.5 °C)  Heart Rate:  [58-74] 58  Resp:  [16-18] 18  BP: (108-135)/() 116/75    Physical Exam:   General: awake, alert, NAD    Head: Normocephalic, atraumatic  Eyes: PERRL, EOMI, no scleral icterus  ENT: MMM, OP clear, no thrush. Fair dentition but some missing teeth  Neck: Supple  Cardiovascular: NR, RR, no murmurs, rubs, or gallops;  no LE edema  Respiratory: Lungs are clear to ascultation bilaterally, no rales or wheezing; normal work of breathing on ambient air   GI: Abdomen is obese, soft, non-tender, non-distended, normal bowel sounds in all four quadrants; no hepatosplenomegaly, no masses palpated  : no Hogan catheter present  Musculoskeletal: R knee w/ effusion but no erythema; pain w/ passive movement;  normal musculature  Skin: No rashes, lesions, or embolic phenomenon  Neurological: Alert and oriented x 3, motor strength 5/5 in UEs; 4/5 in RLE and 5/5 in LLE  Psychiatric: Normal mood and affect   Lymph: no pre-auricular, post-auricular, submandibular, cervical, supraclavicular  LAD  Vasc: no cyanosis; PIV w/o erythema    Labs:     Lab Results   Component Value Date    WBC 6.37 01/12/2017    HGB 12.9 (L) 01/12/2017    HCT 41.2 01/12/2017    MCV 90.9 01/12/2017     01/12/2017       Lab Results   Component Value Date    GLUCOSE 152 (H) 01/12/2017    BUN 43 (H) 01/12/2017    CREATININE 1.50 (H) 01/12/2017    EGFRIFNONA 50 (L) 01/12/2017    BCR 28.7 (H) 01/12/2017    CO2 26.6 01/12/2017    CALCIUM 9.3 01/12/2017    ALBUMIN 3.20 (L) 01/12/2017    LABIL2 0.7 01/12/2017    AST 82 (H) 01/12/2017    ALT 80 (H) 01/12/2017     Lab Results   Component Value Date    CRP 8.96 (H) 01/12/2017     Lab Results   Component Value Date    SEDRATE 44 (H) 01/11/2017       Arthrocentesis:  16,800 RBCs  15,800 WBCs (79% PMNs)  + gout crystals    Microbiology:  1/7 Knee Cx:  #1) scant Staph epi  #2) scant Staph warneri    Radiology (personally reviewed):  XR knee with large joint effusion    Assessment/Plan   1. Polyarthritis secondary to gout  -agree w/ steroid treatment and NSAIDs per orthopedics/hospitalist  -the Staph epi and Staph warneri  on the knee arthrocentesis cultures are in scant amounts and are unlikely to represent true infection in a native knee without active cellulitis overlying; additionally the cell counts of the knee fluid are most consistent with a crystal arthropathy rather than a septic arthritis; I believe they are contaminants and no antibiotics are required      Thank you for this consult. ID will see as needed but please call me at [phone] if any further questions.

## 2017-01-12 NOTE — PAYOR COMM NOTE
"Sarah Ralph (51 y.o. Male)          ATTENTION; NEGRITA MISTI    ERICKA PENDING [id number], REPLY TO UR DEPT, CHUCKY THAO N        [phone] OR UR  [phone]       Date of Birth Social Security Number Address Home Phone MRN    1965  [address] [place] [address] [phone] [id number]    Judaism Marital Status          Unknown        Admission Date Admission Type Admitting Provider Attending Provider Department, Room/Bed    17 Emergency Stan Nunez MD Broaddus, Emmett J., MD [facility] CLIN DECIS UNIT, CP6/    Discharge Date Discharge Disposition Discharge Destination                      Attending Provider: Stan Nunez MD     Allergies:  Sulfa Antibiotics    Isolation:  None   Infection:  None   Code Status:  FULL    Ht:  66\" (167.6 cm)   Wt:  270 lb (122 kg)    Admission Cmt:  None   Principal Problem:  None                Active Insurance as of 2017     Primary Coverage     Payor Plan Insurance Group Employer/Plan Group    [employer] [id number]     Payor Plan Address Payor Plan Phone Number Effective From Effective To    [address] [phone] 2016     [place] [address]       Subscriber Name Subscriber Birth Date Member ID       SARAH RALPH 1965 [id number]                 Emergency Contacts      (Rel.) Home Phone Work Phone Mobile Phone    Ronda Ralph (Spouse) [phone] -- --               History & Physical      Stan Nunez MD at 2017 10:08 AM          HISTORY AND PHYSICAL   [facility]        Patient Identification:  Name: Sarah Ralph  Age: 51 y.o.  Sex: male  :  1965  MRN: [id number]                     Primary Care Physician: Kelsey Foy MD    Chief Complaint:  Joint pain    History of Present Illness:   Pleasant 51-year-old gentleman with a history of gout presenting with pain and swelling of the right knee and both ankles.  " He notes that he had severe pain in this left shoulder in early December,  He was seen at an urgent care center in late Dec and was given an nonsteroidal anti-inflammatory..  The did help a good deal although he still has some shoulder discomfort.  About 9 or 10 days ago he developed fairly sudden onset of pain and swelling in the right knee.  This was followed by pain and swelling in both ankles.  This appointment was essentially nonambulatory.  He denies any fevers or sweats but states he may have felt a little chilled.  He does not recall the knee or ankles appearing erythematous or hot to touch.  He presented to the emergency room for the right knee was tapped he was started on narcotics for pain and culture seen.  He was a bit reluctant to start the culture seen as a new cause diarrhea and he had difficulty getting to the bathroom.  He presented back to the emergency room last night and was started on steroids and admitted.  He states he is feeling a little better this morning.  His previous gouty attacks have been more typical involving the toes and occasionally an ankle.  No unusual rashes.  No insect or tick bites.  Results of the tap on the previous emergency room visit did show intracellular crystals consistent with uric acid.  There is also positive culture for very light growth of staph epi.    Past Medical History:  Past Medical History   Diagnosis Date   • GERD (gastroesophageal reflux disease)    • Gout    • Hyperlipidemia    • Hypertension    • Kidney stone      Past Surgical History:  Past Surgical History   Procedure Laterality Date   • Cholecystectomy     • Cystoscopy w/ laser lithotripsy     • Vasectomy     • Cyst removal       Neck      Home Meds:  Prescriptions Prior to Admission   Medication Sig Dispense Refill Last Dose   • ATENOLOL PO Take  by mouth.      • atenolol-chlorthalidone (TENORETIC) 50-25 MG per tablet Take 1 tablet by mouth Daily.  0 Past Week at Unknown time   • colchicine 0.6 MG  tablet Take 1 tablet by mouth Daily. 1 tablet 0    • HYDROcodone-acetaminophen (NORCO) 7.5-325 MG per tablet Take 1 tablet by mouth Every 6 (Six) Hours As Needed for severe pain (7-10). 15 tablet 0    • pantoprazole (PROTONIX) 40 MG EC tablet Take 1 tablet by mouth Daily.  2    • zolpidem (AMBIEN) 10 MG tablet Take 1 tablet by mouth At Night As Needed.  0 More than a month at Unknown time   • diclofenac (VOLTAREN) 75 MG EC tablet Take 1 tablet by mouth 2 (Two) Times a Day. 15 tablet 0 Past Week at Unknown time   • indomethacin (INDOCIN) 25 MG capsule Take 1 capsule by mouth 3 (Three) Times a Day As Needed.  2    • NIACIN CR PO Take  by mouth.   Past Month at Unknown time       Allergies:  Allergies   Allergen Reactions   • Sulfa Antibiotics Other (See Comments)     UNSURE  Pt states unknown reaction, that his mother always told him that he was allergic.     Immunizations:    There is no immunization history on file for this patient.  Social History:   Social History     Social History Narrative     Social History   Substance Use Topics   • Smoking status: Never Smoker   • Smokeless tobacco: Not on file   • Alcohol use No     Family History:  History reviewed. No pertinent family history.     Review of Systems  Review of Systems   All other systems reviewed and are negative.      Objective:  tMax 24 hrs: Temp (24hrs), Av.1 °F (36.7 °C), Min:97.7 °F (36.5 °C), Max:98.4 °F (36.9 °C)    Vitals Ranges:   Temp:  [97.7 °F (36.5 °C)-98.4 °F (36.9 °C)] 97.7 °F (36.5 °C)  Heart Rate:  [58-74] 58  Resp:  [16-18] 18  BP: (108-135)/() 116/75      Exam:  Physical Exam   Constitutional: He is oriented to person, place, and time. He appears well-developed and well-nourished. No distress.   HENT:   Head: Normocephalic and atraumatic.   Right Ear: External ear normal.   Left Ear: External ear normal.   Nose: Nose normal.   Mouth/Throat: Oropharynx is clear and moist.   Eyes: Conjunctivae and EOM are normal. Right eye  exhibits no discharge. Left eye exhibits no discharge. No scleral icterus.   Neck: Neck supple. No JVD present. No tracheal deviation present. No thyromegaly present.   Cardiovascular: Normal rate, regular rhythm, normal heart sounds and intact distal pulses.    Pulmonary/Chest: Effort normal and breath sounds normal. No stridor. No respiratory distress. He exhibits no tenderness.   Abdominal: Soft. Bowel sounds are normal. He exhibits no distension and no mass. There is no tenderness. There is no rebound and no guarding.   Musculoskeletal: He exhibits no edema or deformity.   There is visible swelling of the right knee.  On palpation this appears to be a joint effusion.  No appreciable tenosynovitis.  There is also some mild swelling of the ankles.  These joints are presently nontender which a patient notes is actually considerable improvement.  No erythema and no increased warmth on palpation.  There is some limitation in flexion of the right knee secondary to the effusion.   Neurological: He is alert and oriented to person, place, and time. No cranial nerve deficit. He exhibits normal muscle tone. Coordination normal.   Skin: Skin is warm and dry. He is not diaphoretic.   Psychiatric: He has a normal mood and affect. His behavior is normal. Judgment and thought content normal.       Data Review:  All labs and radiology reviewed.    Assessment:  Active Problems:    Polyarticular arthritis - Gout:  + arthrocentesis culture - Appears to be a contaminant.  Will get an ID opinion.  Hyperglycemia - Check A1c, monitor, SSI as needed.  CKD III (?) - baseline not known.   Leukocytosis:       Plan:  Please see above.  D/C thiazide diuretics.   Will also evaluate for possible CTD.      Stan Nunez MD  1/12/2017  10:08 AM       Electronically signed by Stan Nunez MD at 1/12/2017 10:36 AM           Emergency Department Notes      Vilma Krishna, RN at 1/11/2017  7:44 PM          Patient seen here Saturday  "for the same thing. Reports pain in his feet from his gout and up into his right knee. States that his kidney function was elevated.      Vilma Krishna RN  01/11/17 1945       Electronically signed by Vilma Krishna RN at 1/11/2017  7:45 PM      Remberto Steele MD at 1/11/2017 10:45 PM           EMERGENCY DEPARTMENT ENCOUNTER    CHIEF COMPLAINT  Chief Complaint: Arthralgias  History given by: Patient, Spouse  History limited by: N/A  Room Number: 16/16  PMD: Kelsey Foy MD      HPI:  Pt reports that on 12/22/16, pt developed right shoulder pain for which pt was seen at an Urgent Care Center and was prescribed diclofenac for biceps tendinitis. Several days later, pt developed pain to the left shoulder. Pt had a C-Spine X-Ray performed for this at his PMD's office. About 9 days ago, pt developed pain to the right knee with swelling for which he was seen in the ER on 01/07/17 and had an arthrocentesis performed. Pt has also had bilateral ankle pain (left > right). Pt states that his pain remains unchanged from initial onset. Pt has taken ibuprofen with no sx relief.  No known tick bites. There are no other complaints at this time.       Pain Location: Shoulders bilaterally, right knee, bilateral ankles   Radiation: N/A  Quality: \"painful\"  Intensity/Severity: Moderate  Duration: Onset on 12/22/16  Onset quality: Gradual  Timing: Intermittent   Progression: Unchanged  Aggravating Factors: Movement of the shoulders, right knee, and ankles   Alleviating Factors: Resting the shoulders, right knee, and ankles  Previous Episodes: None  Treatment before arrival: Ibuprofen  Associated Symptoms: Swelling to the right knee       PAST MEDICAL HISTORY  Active Ambulatory Problems     Diagnosis Date Noted   • No Active Ambulatory Problems     Resolved Ambulatory Problems     Diagnosis Date Noted   • No Resolved Ambulatory Problems     Past Medical History   Diagnosis Date   • GERD (gastroesophageal reflux disease)    • Gout "    • Hyperlipidemia    • Hypertension    • Kidney stone          PAST SURGICAL HISTORY  Past Surgical History   Procedure Laterality Date   • Cholecystectomy     • Cystoscopy w/ laser lithotripsy     • Knee arthroscopy     • Vasectomy           FAMILY HISTORY  History reviewed. No pertinent family history.      SOCIAL HISTORY  Social History     Social History   • Marital status:      Spouse name: N/A   • Number of children: N/A   • Years of education: N/A       Social History Main Topics   • Smoking status: Never Smoker   • Smokeless tobacco: Not on file   • Alcohol use No   • Drug use: No   • Sexual activity: Defer       ALLERGIES  Sulfa antibiotics        REVIEW OF SYSTEMS  Review of Systems   Constitutional: Negative for chills and fatigue.   HENT: Negative for congestion, rhinorrhea and sore throat.    Eyes: Negative for pain.   Respiratory: Negative for cough, shortness of breath and wheezing.    Cardiovascular: Negative for chest pain, palpitations and leg swelling.   Gastrointestinal: Negative for abdominal pain, diarrhea, nausea and vomiting.   Genitourinary: Negative for difficulty urinating, dysuria, flank pain and frequency.   Musculoskeletal: Positive for arthralgias (to the shoulders bilaterally, to the right knee, and bilateral ankles (left > right)) and joint swelling (to the right knee). Negative for neck pain and neck stiffness.   Skin: Negative for rash.   Neurological: Negative for dizziness, speech difficulty, weakness, light-headedness, numbness and headaches.   Psychiatric/Behavioral: Negative.    All other systems reviewed and are negative.           PHYSICAL EXAM  ED Triage Vitals   Temp Heart Rate Resp BP SpO2   01/11/17 1937 01/11/17 1937 01/11/17 1937 01/11/17 1954 01/11/17 1937   98.4 °F (36.9 °C) 74 16 132/82 100 % WNL      Temp src Heart Rate Source Patient Position BP Location FiO2 (%)   01/11/17 1937 01/11/17 1937 01/11/17 1954 01/11/17 1954 --   Tympanic Monitor Sitting Right  arm        Physical Exam   Constitutional: He is oriented to person, place, and time. No distress.   HENT:   Head: Normocephalic.   Mouth/Throat: Mucous membranes are normal.   Eyes: EOM are normal. Pupils are equal, round, and reactive to light.   Neck: Normal range of motion. Neck supple.   Cardiovascular: Normal rate, regular rhythm and normal heart sounds.    Pulmonary/Chest: Effort normal and breath sounds normal. No respiratory distress. He has no decreased breath sounds. He has no wheezes. He has no rhonchi. He has no rales.   Abdominal: Soft. There is no tenderness. There is no rebound and no guarding.   Musculoskeletal: He exhibits edema (there is swelling to the ankles bilaterally) and tenderness (there is tenderness to the right knee and ankles bilaterally).   Right knee effusion   Neurological: He is alert and oriented to person, place, and time. He has normal sensation and normal strength.   Skin: Skin is warm and dry.   Psychiatric: Mood and affect normal.   Nursing note and vitals reviewed.          LAB RESULTS  Recent Results (from the past 24 hour(s))   Comprehensive Metabolic Panel    Collection Time: 01/11/17  8:42 PM   Result Value Ref Range    Glucose 127 (H) 65 - 99 mg/dL    BUN 46 (H) 6 - 20 mg/dL    Creatinine 1.73 (H) 0.76 - 1.27 mg/dL    Sodium 138 136 - 145 mmol/L    Potassium 4.8 3.5 - 5.2 mmol/L    Chloride 96 (L) 98 - 107 mmol/L    CO2 29.1 (H) 22.0 - 29.0 mmol/L    Calcium 9.7 8.6 - 10.5 mg/dL    Total Protein 8.4 6.0 - 8.5 g/dL    Albumin 3.70 3.50 - 5.20 g/dL    ALT (SGPT) 48 (H) 1 - 41 U/L    AST (SGOT) 28 1 - 40 U/L    Alkaline Phosphatase 162 (H) 39 - 117 U/L    Total Bilirubin 0.7 0.1 - 1.2 mg/dL    eGFR Non African Amer 42 (L) >60 mL/min/1.73    Globulin 4.7 gm/dL    A/G Ratio 0.8 g/dL    BUN/Creatinine Ratio 26.6 (H) 7.0 - 25.0    Anion Gap 12.9 mmol/L   Uric Acid    Collection Time: 01/11/17  8:42 PM   Result Value Ref Range    Uric Acid 12.2 (H) 3.4 - 7.0 mg/dL   CBC Auto  Differential    Collection Time: 01/11/17  8:42 PM   Result Value Ref Range    WBC 12.05 (H) 4.50 - 10.70 10*3/mm3    RBC 4.68 4.60 - 6.00 10*6/mm3    Hemoglobin 13.3 (L) 13.7 - 17.6 g/dL    Hematocrit 43.1 40.4 - 52.2 %    MCV 92.1 79.8 - 96.2 fL    MCH 28.4 27.0 - 32.7 pg    MCHC 30.9 (L) 32.6 - 36.4 g/dL    RDW 13.2 11.5 - 14.5 %    RDW-SD 44.2 37.0 - 54.0 fl    MPV 10.7 6.0 - 12.0 fL    Platelets 367 140 - 500 10*3/mm3    Neutrophil % 74.4 42.7 - 76.0 %    Lymphocyte % 15.0 (L) 19.6 - 45.3 %    Monocyte % 7.5 5.0 - 12.0 %    Eosinophil % 1.9 0.3 - 6.2 %    Basophil % 0.9 0.0 - 1.5 %    Immature Grans % 0.3 0.0 - 0.5 %    Neutrophils, Absolute 8.96 (H) 1.90 - 8.10 10*3/mm3    Lymphocytes, Absolute 1.81 0.90 - 4.80 10*3/mm3    Monocytes, Absolute 0.90 0.20 - 1.20 10*3/mm3    Eosinophils, Absolute 0.23 0.00 - 0.70 10*3/mm3    Basophils, Absolute 0.11 0.00 - 0.20 10*3/mm3    Immature Grans, Absolute 0.04 (H) 0.00 - 0.03 10*3/mm3   C-reactive Protein    Collection Time: 01/11/17  8:42 PM   Result Value Ref Range    C-Reactive Protein 11.33 (H) 0.00 - 0.50 mg/dL   Sedimentation Rate    Collection Time: 01/11/17  8:42 PM   Result Value Ref Range    Sed Rate 44 (H) 0 - 15 mm/hr   Procalcitonin    Collection Time: 01/11/17  8:42 PM   Result Value Ref Range    Procalcitonin 0.17 0.10 - 0.25 ng/mL   Lactic Acid, Plasma    Collection Time: 01/11/17 11:14 PM   Result Value Ref Range    Lactate 0.8 0.5 - 2.0 mmol/L       Ordered the above labs and reviewed the results.        PROCEDURES  Procedures        PROGRESS AND CONSULTS  ED Course     10:55 PM: Blood work, procalcitonin, lactic acid, CRP, and sed rate ordered for further evaluation. IV fluids ordered to hydrate pt.     12:54 AM: Discussed case with Dr. Colbert, hospitalist. He will admit pt to a med/surg bed. Decision time to admit: now.     1:02 AM: Rechecked pt. Pt is resting comfortably and appears in no acute distress. Informed pt that his CRP is 11.33. Sed rate is  44. I have discussed case with Dr. Colbert, hospitalist, who has admitted pt for further evaluation. Pt agrees with plan.     1:16 AM: Ordered morphine and zofran to treat for arthralgias.           MEDICAL DECISION MAKING    MDM  Number of Diagnoses or Management Options     Amount and/or Complexity of Data Reviewed  Clinical lab tests: ordered and reviewed (Sed rate is 44. CRP is 11.33. )  Decide to obtain previous medical records or to obtain history from someone other than the patient: yes  Discuss the patient with other providers: yes (Case d/w Dr. Colbert, hospitalist, who will admit pt to a med/surg bed.   )    Patient Progress  Patient progress: stable             DIAGNOSIS  Final diagnoses:   Polyarticular arthritis   Renal insufficiency         DISPOSITION  Pt admitted to med/surg.    ADMISSION    Discussed treatment plan and reason for admission with pt/family and admitting physician.  Pt/family voiced understanding of the plan for admission for further testing/treatment as needed.       Latest Documented Vital Signs:  As of 12:59 AM  BP- 116/83 HR- 71 Temp- 98.4 °F (36.9 °C) (Tympanic) O2 sat- 95%        --  Documentation assistance provided by kristie Holloway for Dr. Cedric MD.  Information recorded by the williame was done at my direction and has been verified and validated by me.     Lei Holloway  01/12/17 0136       Remberto Steele MD  01/12/17 0218       Electronically signed by Remberto Steele MD at 1/12/2017  2:18 AM      Maritza Hardy RN at 1/12/2017  3:15 AM          PT PROVIDED WITH BOX LUNCH      Maritza Hardy RN  01/12/17 0319       Electronically signed by Maritza Hardy RN at 1/12/2017  3:15 AM        Vital Signs (last 24 hours)       01/11 0700  -  01/12 0659 01/12 0700  -  01/12 1343   Most Recent    Temp (°F)   98.4      97.7     97.7 (36.5)    Heart Rate 65 -  74      58     58    Resp 16 -  18      18     18    /70 -  135/84      116/75     116/75    SpO2 (%) 93 -   100      95     95          Lines, Drains & Airways    Active LDAs     Name:   Placement date:   Placement time:   Site:   Days:    Peripheral IV Line - Single Lumen 01/11/17 2312 median cubital vein (antecubital fossa), right 20 gauge  01/11/17 2312      less than 1         Inactive LDAs     None                Hospital Medications (all)       Dose Frequency Start End    acetaminophen (TYLENOL) tablet 650 mg 650 mg Every 4 Hours PRN 1/12/2017     Sig - Route: Take 2 tablets by mouth Every 4 (Four) Hours As Needed for mild pain (1-3). - Oral    acetaminophen (TYLENOL) tablet 650 mg 650 mg Every 4 Hours PRN 1/12/2017     Sig - Route: Take 2 tablets by mouth Every 4 (Four) Hours As Needed for mild pain (1-3). - Oral    atenolol (TENORMIN) tablet 50 mg 50 mg Every 24 Hours Scheduled 1/13/2017     Sig - Route: Take 1 tablet by mouth Daily. - Oral    colchicine tablet 0.6 mg 0.6 mg Every 12 Hours Scheduled 1/12/2017     Sig - Route: Take 1 tablet by mouth Every 12 (Twelve) Hours. - Oral    colchicine tablet 1.2 mg 1.2 mg Once 1/12/2017 1/12/2017    Sig - Route: Take 2 tablets by mouth 1 (One) Time. - Oral    dextrose (D50W) solution 25 g 25 g As Needed 1/12/2017     Sig - Route: Infuse 50 mL into a venous catheter As Needed for low blood sugar. - Intravenous    dextrose (GLUTOSE) oral gel 15 g 15 g As Needed 1/12/2017     Sig - Route: Take 15 g by mouth As Needed for low blood sugar. - Oral    glucagon (human recombinant) (GLUCAGEN DIAGNOSTIC) injection 1 mg 1 mg Once As Needed 1/12/2017     Sig - Route: Inject 1 mg under the skin 1 (One) Time As Needed (hypoglycemia). - Subcutaneous    HYDROcodone-acetaminophen (NORCO) 5-325 MG per tablet 1 tablet 1 tablet Every 4 Hours PRN 1/12/2017 1/22/2017    Sig - Route: Take 1 tablet by mouth Every 4 (Four) Hours As Needed for moderate pain (4-6). - Oral    insulin aspart (novoLOG) injection 0-7 Units 0-7 Units 4 Times Daily Before Meals & Nightly 1/12/2017     Sig - Route:  "Inject 0-7 Units under the skin 4 (Four) Times a Day Before Meals & at Bedtime. - Subcutaneous    methylPREDNISolone sodium succinate (SOLU-Medrol) injection 125 mg 125 mg Once 1/12/2017 1/12/2017    Sig - Route: Infuse 2 mL into a venous catheter 1 (One) Time. - Intravenous    morphine injection 4 mg 4 mg Once 1/12/2017 1/12/2017    Sig - Route: Infuse 1 mL into a venous catheter 1 (One) Time. - Intravenous    ondansetron (ZOFRAN) injection 4 mg 4 mg Once 1/12/2017 1/12/2017    Sig - Route: Infuse 2 mL into a venous catheter 1 (One) Time. - Intravenous    ondansetron (ZOFRAN) injection 4 mg 4 mg Every 6 Hours PRN 1/12/2017     Sig - Route: Infuse 2 mL into a venous catheter Every 6 (Six) Hours As Needed for nausea or vomiting. - Intravenous    Linked Group 1:  \"Or\" Linked Group Details        ondansetron (ZOFRAN) tablet 4 mg 4 mg Every 6 Hours PRN 1/12/2017     Sig - Route: Take 1 tablet by mouth Every 6 (Six) Hours As Needed for nausea or vomiting. - Oral    Linked Group 1:  \"Or\" Linked Group Details        ondansetron ODT (ZOFRAN-ODT) disintegrating tablet 4 mg 4 mg Every 6 Hours PRN 1/12/2017     Sig - Route: Take 1 tablet by mouth Every 6 (Six) Hours As Needed for nausea or vomiting. - Oral    Linked Group 1:  \"Or\" Linked Group Details        pantoprazole (PROTONIX) EC tablet 40 mg 40 mg Every Early Morning 1/12/2017     Sig - Route: Take 1 tablet by mouth Every Morning. - Oral    predniSONE (DELTASONE) tablet 40 mg 40 mg Daily With Breakfast 1/12/2017     Sig - Route: Take 2 tablets by mouth Daily With Breakfast. - Oral    sodium chloride 0.9 % bolus 500 mL 500 mL Once 1/11/2017 1/12/2017    Sig - Route: Infuse 500 mL into a venous catheter 1 (One) Time. - Intravenous    sodium chloride 0.9 % flush 1-10 mL 1-10 mL As Needed 1/12/2017     Sig - Route: Infuse 1-10 mL into a venous catheter As Needed for line care. - Intravenous    sodium chloride 0.9 % flush 1-10 mL 1-10 mL As Needed 1/12/2017     Sig - Route: " "Infuse 1-10 mL into a venous catheter As Needed for line care. - Intravenous    sodium chloride 0.9 % flush 10 mL 10 mL As Needed 1/11/2017     Sig - Route: Infuse 10 mL into a venous catheter As Needed for line care. - Intravenous    Linked Group 2:  \"And\" Linked Group Details        sodium chloride 0.9 % infusion 75 mL/hr Continuous 1/12/2017     Sig - Route: Infuse 75 mL/hr into a venous catheter Continuous. - Intravenous    zolpidem (AMBIEN) tablet 10 mg 10 mg Nightly PRN 1/12/2017     Sig - Route: Take 2 tablets by mouth At Night As Needed for sleep. - Oral    atenolol-chlorthalidone (TENORETIC) 50-25 MG per tablet 1 tablet (Discontinued) 1 tablet Daily 1/12/2017 1/12/2017    Sig - Route: Take 1 tablet by mouth Daily. - Oral    colchicine tablet 0.6 mg (Discontinued) 0.6 mg Daily 1/12/2017 1/12/2017    Sig - Route: Take 1 tablet by mouth Daily. - Oral    colchicine tablet 1.2 mg (Discontinued) 1.2 mg Daily 1/12/2017 1/12/2017    Sig - Route: Take 2 tablets by mouth Daily. - Oral    dextrose 5 % and sodium chloride 0.45 % with KCl 20 mEq/L infusion (Discontinued) 100 mL/hr Continuous 1/12/2017 1/12/2017    Sig - Route: Infuse 100 mL/hr into a venous catheter Continuous. - Intravenous    enoxaparin (LOVENOX) syringe 40 mg (Discontinued) 40 mg Every 24 Hours 1/12/2017 1/12/2017    Sig - Route: Inject 0.4 mL under the skin Daily. - Subcutaneous    HYDROmorphone (DILAUDID) injection 0.5 mg (Discontinued) 0.5 mg Every 2 Hours PRN 1/12/2017 1/12/2017    Sig - Route: Infuse 0.5 mg into a venous catheter Every 2 (Two) Hours As Needed for severe pain (7-10). - Intravenous    morphine injection 1 mg (Discontinued) 1 mg Every 4 Hours PRN 1/12/2017 1/12/2017    Sig - Route: Infuse 0.5 mL into a venous catheter Every 4 (Four) Hours As Needed for severe pain (7-10). - Intravenous    Linked Group 3:  \"And\" Linked Group Details        naloxone (NARCAN) injection 0.4 mg (Discontinued) 0.4 mg Every 5 Minutes PRN 1/12/2017 " "1/12/2017    Sig - Route: Infuse 1 mL into a venous catheter Every 5 (Five) Minutes As Needed for respiratory depression. - Intravenous    Linked Group 3:  \"And\" Linked Group Details        oxyCODONE-acetaminophen (PERCOCET)  MG per tablet 1 tablet (Discontinued) 1 tablet Every 3 Hours PRN 1/12/2017 1/12/2017    Sig - Route: Take 1 tablet by mouth Every 3 (Three) Hours As Needed for severe pain (7-10). - Oral    sodium chloride 0.9 % infusion (Discontinued) 125 mL/hr Continuous 1/11/2017 1/12/2017    Sig - Route: Infuse 125 mL/hr into a venous catheter Continuous. - Intravenous    sodium chloride 0.9 % infusion (Discontinued) 100 mL/hr Continuous 1/12/2017 1/12/2017    Sig - Route: Infuse 100 mL/hr into a venous catheter Continuous. - Intravenous          Physician Progress Notes (last 24 hours) (Notes from 1/11/2017  1:44 PM through 1/12/2017  1:44 PM)     No notes of this type exist for this encounter.           Consult Notes (last 24 hours) (Notes from 1/11/2017  1:44 PM through 1/12/2017  1:44 PM)      Saqib Sweeney MD at 1/12/2017  8:03 AM  Version 1 of 1               ORTHOPEDIC SURGERY CONSULT      Patient: Farhan Ralph  Date of Admission: 1/11/2017 10:06 PM  YOB: 1965  Medical Record Number: [id number]  Attending Physician: Stan Nunez MD  Consulting Physician: Saqib Sweeney MD    CHIEF COMPLIANT: Bilateral shoulder pain, right knee pain and bilateral ankle pain.    HISTORY OF PRESENT ILLINESS: Patient is a 51 y.o. year old male presents to [facility] with above complaints.  I was consulted for further evaluation and treatment.  Patient is not a good historian, but history was obtained from him and chart review.  Patient has history of gout and began having bilateral shoulder pain on dec 23,2016.  He was seen in an urgent care center and was diagnosed with bicipital tendonits.  Was given NSAID.  Eventually pain did improve and was able to return back to work " "for a few days.  Then pain returned in shoulder and then spread to right knee and eventually both ankles.  Was unable to ambulate.  He usually takes colchicine at home when it \"gets bad\" but he couldn't walk to bathroom and he knows it causes diarhea so he didn't take it.  Has been on oral steroids which he reports didn't really seem to help.  Was seen at [facility] ER where right knee was aspirated on Saturday which showed uric acid crystals.  Was readmitted for worsening symptoms.      ALLERGIES:   Allergies   Allergen Reactions   • Sulfa Antibiotics Other (See Comments)     UNSURE  Pt states unknown reaction, that his mother always told him that he was allergic.       HOME MEDICATIONS:    (Not in a hospital admission)    CURRENT MEDICATIONS:  Scheduled Meds:  atenolol-chlorthalidone 1 tablet Oral Daily   colchicine 0.6 mg Oral Daily   colchicine 1.2 mg Oral Daily   enoxaparin 40 mg Subcutaneous Q24H   pantoprazole 40 mg Oral Q AM   predniSONE 40 mg Oral Daily With Breakfast     Continuous Infusions:  sodium chloride 125 mL/hr Last Rate: Stopped (01/12/17 0301)   sodium chloride 100 mL/hr Last Rate: 100 mL/hr (01/12/17 0303)     PRN Meds:.•  acetaminophen  •  HYDROcodone-acetaminophen  •  Morphine **AND** naloxone  •  sodium chloride  •  Insert peripheral IV **AND** sodium chloride  •  zolpidem    Past Medical History   Diagnosis Date   • GERD (gastroesophageal reflux disease)    • Gout    • Hyperlipidemia    • Hypertension    • Kidney stone      Past Surgical History   Procedure Laterality Date   • Cholecystectomy     • Cystoscopy w/ laser lithotripsy     • Knee arthroscopy     • Vasectomy       Social History     Occupational History   • Not on file.     Social History Main Topics   • Smoking status: Never Smoker   • Smokeless tobacco: Not on file   • Alcohol use No   • Drug use: No   • Sexual activity: Defer    Social History     Social History Narrative   • No narrative on file     History reviewed. No pertinent " family history.    REVIEW OF SYSTEMS:    HEENT: Patient denies any headaches, vision changes, change in hearing, or tinnitus, Patient denies epistaxis, sinus pain, hoarseness, or dysphagia   Pulmonary: Patient denies any cough, congestion, acute change in SOA or wheezing.   Cardiovascular: Patient denies any change in chest pain, dyspnea, palpitations, weakness, intolerance of exercise, varicosities, change in murmur   Gastrointestinal:  Patient denies change in appetite, melena, change in bowel habits.   Genital/Urinary: Patient denies dysuria, change in color of urine, change in frequency of urination, pain with urgency, change in incontinence, retention.   Musculoskeletal: Patient denies complaints of acute changes in symptoms of other joints not mentioned above.   Neurological: Patient denies changes in dizziness, tremor, ataxia, or difficulty in speaking or changes in memory.   Endocrine system: Patient denies acute changes in tremors, palpitations, polyuria, polydipsia, polyphagia, diaphoresis, exophthalmos, or goiter.   Psychological: Patient denies thoughts/plans or harming self or other; denies acute changes in depression,  insomnia, night terrors, jayla, disorientation.   Skin: Patient denies any bruising, rashes, discoloration, pruritus,or wounds not mentioned in history of present illness or chief complaint above.   Hematopoietic: Patient denies current bleeding, epistaxis, hematuria, or melena.    PHYSICAL EXAM:   Vitals:  Vitals:    01/12/17 0313 01/12/17 0343 01/12/17 0443 01/12/17 0635   BP: 124/89 135/84 126/78 108/70   BP Location:       Patient Position:    Sitting   Pulse:    65   Resp:    18   Temp:       TempSrc:       SpO2: 95% 97% 95% 95%   Weight:       Height:           General:  51 y.o. male who appears about stated age.    Alert, cooperative, in no acute distress         Head:    Normocephalic, without obvious abnormality, atraumatic   Eyes:            Lids and lashes normal, conjunctivae  and sclerae normal, no         icterus, no pallor, corneas clear, PERRLA   Ears:    Ears appear intact with no abnormalities noted   Throat:   No oral lesions, no thrush, oral mucosa moist   Neck:   No adenopathy, supple, trachea midline, no JVD   Back:     Limited exam shows no severe kyphosis present,no visible           erythema, no excessive  tenderness to palpation.    Lungs:     Respirations regular, even and unlabored.     Heart:    Normal rate, Pulses palpable   Chest Wall:    No abnormalities observed.   Abdomen:     Normal bowel sounds, no masses, no organomegaly, soft              non-tender, non-distended, no guarding, no rebound                      tenderness   Rectal:     Deferred   Pulses:   Pulses palpable and equal bilaterally   Skin:   No bleeding, bruising or rash   Lymph nodes:   No palpable adenopathy   Extremities:     Right knee skin intact.  Moderate warmth.  Painful AROM 5-60 degrees.  Ligaments stable.  NVI distally    Bilateral ankle: skin intact.  Painful ROM.  Mild warm.  Moderate swelling of bilateral feet and ankles.        DIAGNOSTIC TEST:  Admission on 01/11/2017   Component Date Value Ref Range Status   • Glucose 01/11/2017 127* 65 - 99 mg/dL Final   • BUN 01/11/2017 46* 6 - 20 mg/dL Final   • Creatinine 01/11/2017 1.73* 0.76 - 1.27 mg/dL Final   • Sodium 01/11/2017 138  136 - 145 mmol/L Final   • Potassium 01/11/2017 4.8  3.5 - 5.2 mmol/L Final   • Chloride 01/11/2017 96* 98 - 107 mmol/L Final   • CO2 01/11/2017 29.1* 22.0 - 29.0 mmol/L Final   • Calcium 01/11/2017 9.7  8.6 - 10.5 mg/dL Final   • Total Protein 01/11/2017 8.4  6.0 - 8.5 g/dL Final   • Albumin 01/11/2017 3.70  3.50 - 5.20 g/dL Final   • ALT (SGPT) 01/11/2017 48* 1 - 41 U/L Final   • AST (SGOT) 01/11/2017 28  1 - 40 U/L Final   • Alkaline Phosphatase 01/11/2017 162* 39 - 117 U/L Final   • Total Bilirubin 01/11/2017 0.7  0.1 - 1.2 mg/dL Final   • eGFR Non African Amer 01/11/2017 42* >60 mL/min/1.73 Final   • Globulin  01/11/2017 4.7  gm/dL Final   • A/G Ratio 01/11/2017 0.8  g/dL Final   • BUN/Creatinine Ratio 01/11/2017 26.6* 7.0 - 25.0 Final   • Anion Gap 01/11/2017 12.9  mmol/L Final   • Uric Acid 01/11/2017 12.2* 3.4 - 7.0 mg/dL Final   • WBC 01/11/2017 12.05* 4.50 - 10.70 10*3/mm3 Final   • RBC 01/11/2017 4.68  4.60 - 6.00 10*6/mm3 Final   • Hemoglobin 01/11/2017 13.3* 13.7 - 17.6 g/dL Final   • Hematocrit 01/11/2017 43.1  40.4 - 52.2 % Final   • MCV 01/11/2017 92.1  79.8 - 96.2 fL Final   • MCH 01/11/2017 28.4  27.0 - 32.7 pg Final   • MCHC 01/11/2017 30.9* 32.6 - 36.4 g/dL Final   • RDW 01/11/2017 13.2  11.5 - 14.5 % Final   • RDW-SD 01/11/2017 44.2  37.0 - 54.0 fl Final   • MPV 01/11/2017 10.7  6.0 - 12.0 fL Final   • Platelets 01/11/2017 367  140 - 500 10*3/mm3 Final   • Neutrophil % 01/11/2017 74.4  42.7 - 76.0 % Final   • Lymphocyte % 01/11/2017 15.0* 19.6 - 45.3 % Final   • Monocyte % 01/11/2017 7.5  5.0 - 12.0 % Final   • Eosinophil % 01/11/2017 1.9  0.3 - 6.2 % Final   • Basophil % 01/11/2017 0.9  0.0 - 1.5 % Final   • Immature Grans % 01/11/2017 0.3  0.0 - 0.5 % Final   • Neutrophils, Absolute 01/11/2017 8.96* 1.90 - 8.10 10*3/mm3 Final   • Lymphocytes, Absolute 01/11/2017 1.81  0.90 - 4.80 10*3/mm3 Final   • Monocytes, Absolute 01/11/2017 0.90  0.20 - 1.20 10*3/mm3 Final   • Eosinophils, Absolute 01/11/2017 0.23  0.00 - 0.70 10*3/mm3 Final   • Basophils, Absolute 01/11/2017 0.11  0.00 - 0.20 10*3/mm3 Final   • Immature Grans, Absolute 01/11/2017 0.04* 0.00 - 0.03 10*3/mm3 Final   • C-Reactive Protein 01/11/2017 11.33* 0.00 - 0.50 mg/dL Final   • Sed Rate 01/11/2017 44* 0 - 15 mm/hr Final   • Procalcitonin 01/11/2017 0.17  0.10 - 0.25 ng/mL Final   • Lactate 01/11/2017 0.8  0.5 - 2.0 mmol/L Final   • WBC 01/12/2017 6.37  4.50 - 10.70 10*3/mm3 Final   • RBC 01/12/2017 4.53* 4.60 - 6.00 10*6/mm3 Final   • Hemoglobin 01/12/2017 12.9* 13.7 - 17.6 g/dL Final   • Hematocrit 01/12/2017 41.2  40.4 - 52.2 % Final   • MCV  01/12/2017 90.9  79.8 - 96.2 fL Final   • MCH 01/12/2017 28.5  27.0 - 32.7 pg Final   • MCHC 01/12/2017 31.3* 32.6 - 36.4 g/dL Final   • RDW 01/12/2017 13.1  11.5 - 14.5 % Final   • RDW-SD 01/12/2017 43.2  37.0 - 54.0 fl Final   • MPV 01/12/2017 10.8  6.0 - 12.0 fL Final   • Platelets 01/12/2017 273  140 - 500 10*3/mm3 Final   • Neutrophil % 01/12/2017 84.9* 42.7 - 76.0 % Final   • Lymphocyte % 01/12/2017 11.8* 19.6 - 45.3 % Final   • Monocyte % 01/12/2017 2.2* 5.0 - 12.0 % Final   • Eosinophil % 01/12/2017 0.6  0.3 - 6.2 % Final   • Basophil % 01/12/2017 0.5  0.0 - 1.5 % Final   • Immature Grans % 01/12/2017 0.0  0.0 - 0.5 % Final   • Neutrophils, Absolute 01/12/2017 5.41  1.90 - 8.10 10*3/mm3 Final   • Lymphocytes, Absolute 01/12/2017 0.75* 0.90 - 4.80 10*3/mm3 Final   • Monocytes, Absolute 01/12/2017 0.14* 0.20 - 1.20 10*3/mm3 Final   • Eosinophils, Absolute 01/12/2017 0.04  0.00 - 0.70 10*3/mm3 Final   • Basophils, Absolute 01/12/2017 0.03  0.00 - 0.20 10*3/mm3 Final   • Immature Grans, Absolute 01/12/2017 0.00  0.00 - 0.03 10*3/mm3 Final   • Glucose 01/12/2017 152* 65 - 99 mg/dL Final   • BUN 01/12/2017 43* 6 - 20 mg/dL Final   • Creatinine 01/12/2017 1.50* 0.76 - 1.27 mg/dL Final   • Sodium 01/12/2017 138  136 - 145 mmol/L Final   • Potassium 01/12/2017 4.6  3.5 - 5.2 mmol/L Final   • Chloride 01/12/2017 99  98 - 107 mmol/L Final   • CO2 01/12/2017 26.6  22.0 - 29.0 mmol/L Final   • Calcium 01/12/2017 9.3  8.6 - 10.5 mg/dL Final   • Total Protein 01/12/2017 7.6  6.0 - 8.5 g/dL Final   • Albumin 01/12/2017 3.20* 3.50 - 5.20 g/dL Final   • ALT (SGPT) 01/12/2017 80* 1 - 41 U/L Final   • AST (SGOT) 01/12/2017 82* 1 - 40 U/L Final   • Alkaline Phosphatase 01/12/2017 210* 39 - 117 U/L Final   • Total Bilirubin 01/12/2017 0.7  0.1 - 1.2 mg/dL Final   • eGFR Non African Amer 01/12/2017 50* >60 mL/min/1.73 Final   • Globulin 01/12/2017 4.4  gm/dL Final   • A/G Ratio 01/12/2017 0.7  g/dL Final   • BUN/Creatinine Ratio  01/12/2017 28.7* 7.0 - 25.0 Final   • Anion Gap 01/12/2017 12.4  mmol/L Final       Xr Shoulder 2+ View Right    Result Date: 12/23/2016  Narrative: 2 RADIOGRAPHIC VIEWS OF THE RIGHT SHOULDER  CLINICAL HISTORY: No known trauma. Right shoulder pain.  FINDINGS: 2 radiographic views of the right shoulder demonstrate no evidence for acute fracture or bony malalignment. No significant osseous or soft tissue abnormality is noted.  This report was finalized on 12/23/2016 7:35 PM by Dr. Benjamin Sanderson MD.      Xr Knee 1 Or 2 View Right    Result Date: 1/8/2017  Narrative: RIGHT KNEE 2 VIEWS.  HISTORY: Knee pain, no injury.  COMPARISON: No prior studies for comparison.  FINDINGS: There is no fracture or dislocation.  Moderately large joint effusion is noted.  Soft tissue structures are unremarkable.      Impression: No acute fracture or dislocation. Moderately large knee joint effusion.  This report was finalized on 1/8/2017 11:27 PM by Dr. Carlitos Cotton MD.          ASSESSMENT:  Gout    Patient Active Problem List   Diagnosis   • Polyarticular arthritis       PLAN:    Will start colchicine, already on iv and oral steroids.  When symptoms improve will start allopurinol.  Consult P.T.  Home when ambulatory.      The above diagnosis and treatment plan was discussed with the patient.  They were educated in treatment options for their condition.   They were given the opportunity to ask questions and were answered to their satisfaction.  They agreed to proceed with the above treatment plan.        Saqib Sweeney MD  Date: 1/12/2017         Electronically signed by Saqib Sweeney MD at 1/12/2017  8:11 AM      Gwyn Lentz MD at 1/12/2017 10:44 AM  Version 1 of 1     Consult Orders:    1. Inpatient Consult to Infectious Diseases [[id number]] ordered by Stan Nunez MD at 01/12/17 1035                Referring Provider: Enrike Nunez MD    Reason for Consultation: + knee culture    History of present illness:  Ivan  "is a 51 YOM with PMH of gout who I am asked to evaluate and give opinion for + knee culture. History from the patient and review of the medical records which I summarize/synthesize as follows: He tells me that he has had gout many times in the past often affecting the knees, foot, elbow, or hand. About 1 week ago he had sudden onset sharp R knee pain not relieved with indomethacin or ibuprofen. He also had B shoulder pain with associated decreased range of motion. He has never had shoulder issues before. He came to the ER on 1/7 and had an arthrocentesis as outlined below. XR showed an effusion. He was discharged on Lortab and colchicine. He returned to the ER on 1/11 with persistent pain in the knee moreso than the shoulder. He was started on IV and oral steroids along w/ colochicine and reports improvement.     He denies cellulitis overlying the knee. He was not having fevers. He thinks he did have a few chills. He has never had prior knee surgery.    Past Medical History   Diagnosis Date   • GERD (gastroesophageal reflux disease)    • Gout    • Hyperlipidemia    • Hypertension    • Kidney stone        Past Surgical History   Procedure Laterality Date   • Cholecystectomy     • Cystoscopy w/ laser lithotripsy     • Vasectomy     • Cyst removal       Neck       Social History:  Lives w/ 3 children in [place]  Works at factory [employer]  No illicits    Family History:  + family history of gout    Allergies:  Sulfa (\"I don't know what happens; my mom just told me I was\")    Medications:    Current Facility-Administered Medications:   •  acetaminophen (TYLENOL) tablet 650 mg, 650 mg, Oral, Q4H PRN, Meek Colbert MD  •  acetaminophen (TYLENOL) tablet 650 mg, 650 mg, Oral, Q4H PRN, Stan Nunez MD  •  [START ON 1/13/2017] atenolol (TENORMIN) tablet 50 mg, 50 mg, Oral, Q24H, Stan Nunez MD  •  colchicine tablet 0.6 mg, 0.6 mg, Oral, Q12H, Saqib Sweeney MD  •  colchicine tablet 1.2 mg, 1.2 " mg, Oral, Once, Saqib Sweeney MD  •  dextrose (D50W) solution 25 g, 25 g, Intravenous, PRN, Stan Nunez MD  •  dextrose (GLUTOSE) oral gel 15 g, 15 g, Oral, PRN, Stan Nunez MD  •  glucagon (human recombinant) (GLUCAGEN DIAGNOSTIC) injection 1 mg, 1 mg, Subcutaneous, Once PRN, Stan Nunez MD  •  HYDROcodone-acetaminophen (NORCO) 5-325 MG per tablet 1 tablet, 1 tablet, Oral, Q4H PRN, Meek Colbert MD, 1 tablet at 01/12/17 0456  •  insulin aspart (novoLOG) injection 0-7 Units, 0-7 Units, Subcutaneous, 4x Daily AC & at Bedtime, Stan Nunez MD  •  ondansetron (ZOFRAN) tablet 4 mg, 4 mg, Oral, Q6H PRN **OR** ondansetron ODT (ZOFRAN-ODT) disintegrating tablet 4 mg, 4 mg, Oral, Q6H PRN **OR** ondansetron (ZOFRAN) injection 4 mg, 4 mg, Intravenous, Q6H PRN, Stan Nunez MD  •  pantoprazole (PROTONIX) EC tablet 40 mg, 40 mg, Oral, Q AM, Meek Colbert MD, 40 mg at 01/12/17 0633  •  predniSONE (DELTASONE) tablet 40 mg, 40 mg, Oral, Daily With Breakfast, Meek Colbert MD  •  sodium chloride 0.9 % flush 1-10 mL, 1-10 mL, Intravenous, PRN, Meek Colbert MD  •  sodium chloride 0.9 % flush 1-10 mL, 1-10 mL, Intravenous, PRN, Stan Nunez MD  •  Insert peripheral IV, , , Once **AND** sodium chloride 0.9 % flush 10 mL, 10 mL, Intravenous, PRN, Remberto Steele MD  •  sodium chloride 0.9 % infusion, 75 mL/hr, Intravenous, Continuous, Stan Nunez MD  •  zolpidem (AMBIEN) tablet 10 mg, 10 mg, Oral, Nightly PRN, Meek Colbert MD      Review of Systems  All systems were reviewed and are negative unless otherwise stated above in the HPI    Objective   Vital Signs   Temp:  [97.7 °F (36.5 °C)-98.4 °F (36.9 °C)] 97.7 °F (36.5 °C)  Heart Rate:  [58-74] 58  Resp:  [16-18] 18  BP: (108-135)/() 116/75    Physical Exam:   General: awake, alert, NAD   Head: Normocephalic, atraumatic  Eyes: PERRL, EOMI, no scleral icterus  ENT: MMM, OP clear, no thrush. Fair  dentition but some missing teeth  Neck: Supple  Cardiovascular: NR, RR, no murmurs, rubs, or gallops;  no LE edema  Respiratory: Lungs are clear to ascultation bilaterally, no rales or wheezing; normal work of breathing on ambient air   GI: Abdomen is obese, soft, non-tender, non-distended, normal bowel sounds in all four quadrants; no hepatosplenomegaly, no masses palpated  : no Hogan catheter present  Musculoskeletal: R knee w/ effusion but no erythema; pain w/ passive movement;  normal musculature  Skin: No rashes, lesions, or embolic phenomenon  Neurological: Alert and oriented x 3, motor strength 5/5 in UEs; 4/5 in RLE and 5/5 in LLE  Psychiatric: Normal mood and affect   Lymph: no pre-auricular, post-auricular, submandibular, cervical, supraclavicular  LAD  Vasc: no cyanosis; PIV w/o erythema    Labs:     Lab Results   Component Value Date    WBC 6.37 01/12/2017    HGB 12.9 (L) 01/12/2017    HCT 41.2 01/12/2017    MCV 90.9 01/12/2017     01/12/2017       Lab Results   Component Value Date    GLUCOSE 152 (H) 01/12/2017    BUN 43 (H) 01/12/2017    CREATININE 1.50 (H) 01/12/2017    EGFRIFNONA 50 (L) 01/12/2017    BCR 28.7 (H) 01/12/2017    CO2 26.6 01/12/2017    CALCIUM 9.3 01/12/2017    ALBUMIN 3.20 (L) 01/12/2017    LABIL2 0.7 01/12/2017    AST 82 (H) 01/12/2017    ALT 80 (H) 01/12/2017     Lab Results   Component Value Date    CRP 8.96 (H) 01/12/2017     Lab Results   Component Value Date    SEDRATE 44 (H) 01/11/2017       Arthrocentesis:  16,800 RBCs  15,800 WBCs (79% PMNs)  + gout crystals    Microbiology:  1/7 Knee Cx:  #1) scant Staph epi  #2) scant Staph warneri    Radiology (personally reviewed):  XR knee with large joint effusion    Assessment&#47;Plan   1. 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 ID will see as needed but please call me at [phone] if any further questions.       Electronically signed by Gwyn Lentz MD at 1/12/2017 12:25 PM

## 2017-01-12 NOTE — PLAN OF CARE
Problem: Mobility, Physical Impaired (Adult)  Goal: Identify Related Risk Factors and Signs and Symptoms  Outcome: Ongoing (interventions implemented as appropriate)

## 2017-01-13 VITALS
BODY MASS INDEX: 41.66 KG/M2 | SYSTOLIC BLOOD PRESSURE: 127 MMHG | DIASTOLIC BLOOD PRESSURE: 80 MMHG | HEART RATE: 60 BPM | OXYGEN SATURATION: 96 % | WEIGHT: 259.2 LBS | TEMPERATURE: 96.3 F | RESPIRATION RATE: 16 BRPM | HEIGHT: 66 IN

## 2017-01-13 LAB
ANION GAP SERPL CALCULATED.3IONS-SCNC: 14.1 MMOL/L
BASOPHILS # BLD AUTO: 0.01 10*3/MM3 (ref 0–0.2)
BASOPHILS NFR BLD AUTO: 0.1 % (ref 0–1.5)
BUN BLD-MCNC: 45 MG/DL (ref 6–20)
BUN/CREAT SERPL: 29.8 (ref 7–25)
CALCIUM SPEC-SCNC: 8.8 MG/DL (ref 8.6–10.5)
CENTROMERE B AB SER-ACNC: <0.2 AI (ref 0–0.9)
CHLORIDE SERPL-SCNC: 101 MMOL/L (ref 98–107)
CHROMATIN AB SERPL-ACNC: <0.2 AI (ref 0–0.9)
CK SERPL-CCNC: 65 U/L (ref 20–200)
CO2 SERPL-SCNC: 22.9 MMOL/L (ref 22–29)
CREAT BLD-MCNC: 1.51 MG/DL (ref 0.76–1.27)
CRP SERPL-MCNC: 5.24 MG/DL (ref 0–0.5)
DEPRECATED RDW RBC AUTO: 42.2 FL (ref 37–54)
DSDNA AB SER-ACNC: <1 IU/ML (ref 0–9)
ENA JO1 AB SER-ACNC: <0.2 AI (ref 0–0.9)
ENA RNP AB SER-ACNC: <0.2 AI (ref 0–0.9)
ENA SCL70 AB SER-ACNC: <0.2 AI (ref 0–0.9)
ENA SM AB SER-ACNC: <0.2 AI (ref 0–0.9)
ENA SS-A AB SER-ACNC: <0.2 AI (ref 0–0.9)
ENA SS-B AB SER-ACNC: <0.2 AI (ref 0–0.9)
EOSINOPHIL # BLD AUTO: 0 10*3/MM3 (ref 0–0.7)
EOSINOPHIL NFR BLD AUTO: 0 % (ref 0.3–6.2)
ERYTHROCYTE [DISTWIDTH] IN BLOOD BY AUTOMATED COUNT: 12.9 % (ref 11.5–14.5)
ERYTHROCYTE [SEDIMENTATION RATE] IN BLOOD: 92 MM/HR (ref 0–15)
GFR SERPL CREATININE-BSD FRML MDRD: 49 ML/MIN/1.73
GLUCOSE BLD-MCNC: 124 MG/DL (ref 65–99)
GLUCOSE BLDC GLUCOMTR-MCNC: 104 MG/DL (ref 70–130)
GLUCOSE BLDC GLUCOMTR-MCNC: 86 MG/DL (ref 70–130)
HBA1C MFR BLD: 5.4 % (ref 4.8–5.6)
HCT VFR BLD AUTO: 36.9 % (ref 40.4–52.2)
HGB BLD-MCNC: 11.8 G/DL (ref 13.7–17.6)
IMM GRANULOCYTES # BLD: 0.02 10*3/MM3 (ref 0–0.03)
IMM GRANULOCYTES NFR BLD: 0.2 % (ref 0–0.5)
LYMPHOCYTES # BLD AUTO: 1.16 10*3/MM3 (ref 0.9–4.8)
LYMPHOCYTES NFR BLD AUTO: 13 % (ref 19.6–45.3)
Lab: NORMAL
MCH RBC QN AUTO: 28.6 PG (ref 27–32.7)
MCHC RBC AUTO-ENTMCNC: 32 G/DL (ref 32.6–36.4)
MCV RBC AUTO: 89.6 FL (ref 79.8–96.2)
MONOCYTES # BLD AUTO: 0.78 10*3/MM3 (ref 0.2–1.2)
MONOCYTES NFR BLD AUTO: 8.7 % (ref 5–12)
NEUTROPHILS # BLD AUTO: 6.95 10*3/MM3 (ref 1.9–8.1)
NEUTROPHILS NFR BLD AUTO: 78 % (ref 42.7–76)
PLATELET # BLD AUTO: 318 10*3/MM3 (ref 140–500)
PMV BLD AUTO: 11.7 FL (ref 6–12)
POTASSIUM BLD-SCNC: 4.6 MMOL/L (ref 3.5–5.2)
RBC # BLD AUTO: 4.12 10*6/MM3 (ref 4.6–6)
SODIUM BLD-SCNC: 138 MMOL/L (ref 136–145)
URATE SERPL-MCNC: 9.9 MG/DL (ref 3.4–7)
WBC NRBC COR # BLD: 8.92 10*3/MM3 (ref 4.5–10.7)

## 2017-01-13 PROCEDURE — 85652 RBC SED RATE AUTOMATED: CPT | Performed by: HOSPITALIST

## 2017-01-13 PROCEDURE — 82962 GLUCOSE BLOOD TEST: CPT

## 2017-01-13 PROCEDURE — 97110 THERAPEUTIC EXERCISES: CPT

## 2017-01-13 PROCEDURE — 63710000001 PREDNISONE PER 5 MG: Performed by: INTERNAL MEDICINE

## 2017-01-13 PROCEDURE — 86140 C-REACTIVE PROTEIN: CPT | Performed by: HOSPITALIST

## 2017-01-13 PROCEDURE — 83036 HEMOGLOBIN GLYCOSYLATED A1C: CPT | Performed by: HOSPITALIST

## 2017-01-13 PROCEDURE — 84550 ASSAY OF BLOOD/URIC ACID: CPT | Performed by: ORTHOPAEDIC SURGERY

## 2017-01-13 PROCEDURE — 82550 ASSAY OF CK (CPK): CPT | Performed by: HOSPITALIST

## 2017-01-13 PROCEDURE — 85025 COMPLETE CBC W/AUTO DIFF WBC: CPT | Performed by: HOSPITALIST

## 2017-01-13 PROCEDURE — 80048 BASIC METABOLIC PNL TOTAL CA: CPT | Performed by: HOSPITALIST

## 2017-01-13 RX ORDER — ALLOPURINOL 300 MG/1
300 TABLET ORAL DAILY
Qty: 30 TABLET | Refills: 0 | Status: SHIPPED | OUTPATIENT
Start: 2017-01-13

## 2017-01-13 RX ORDER — PREDNISONE 20 MG/1
40 TABLET ORAL
Qty: 21 TABLET | Refills: 0 | Status: SHIPPED | OUTPATIENT
Start: 2017-01-13

## 2017-01-13 RX ORDER — HYDROCODONE BITARTRATE AND ACETAMINOPHEN 7.5; 325 MG/1; MG/1
1 TABLET ORAL EVERY 4 HOURS PRN
Qty: 24 TABLET | Refills: 0 | Status: SHIPPED | OUTPATIENT
Start: 2017-01-13

## 2017-01-13 RX ORDER — ATENOLOL 50 MG/1
50 TABLET ORAL
Qty: 30 TABLET | Refills: 0 | Status: SHIPPED | OUTPATIENT
Start: 2017-01-13

## 2017-01-13 RX ORDER — ALLOPURINOL 100 MG/1
100 TABLET ORAL DAILY
Status: DISCONTINUED | OUTPATIENT
Start: 2017-01-13 | End: 2017-01-13 | Stop reason: HOSPADM

## 2017-01-13 RX ADMIN — ACETAMINOPHEN 650 MG: 325 TABLET ORAL at 07:10

## 2017-01-13 RX ADMIN — COLCHICINE 0.6 MG: 0.6 TABLET, FILM COATED ORAL at 09:47

## 2017-01-13 RX ADMIN — SODIUM CHLORIDE 75 ML/HR: 9 INJECTION, SOLUTION INTRAVENOUS at 00:24

## 2017-01-13 RX ADMIN — ATENOLOL 50 MG: 50 TABLET ORAL at 09:47

## 2017-01-13 RX ADMIN — HYDROCODONE BITARTRATE AND ACETAMINOPHEN 1 TABLET: 5; 325 TABLET ORAL at 15:00

## 2017-01-13 RX ADMIN — PREDNISONE 40 MG: 20 TABLET ORAL at 09:47

## 2017-01-13 RX ADMIN — PANTOPRAZOLE SODIUM 40 MG: 40 TABLET, DELAYED RELEASE ORAL at 05:11

## 2017-01-13 RX ADMIN — ALLOPURINOL 100 MG: 100 TABLET ORAL at 14:44

## 2017-01-13 NOTE — THERAPY DISCHARGE NOTE
Acute Care - Physical Therapy Discharge Summary  [facility]       Patient Name: Farhan Ralph  : 1965  MRN: [id number]    Today's Date: 2017  Onset of Illness/Injury or Date of Surgery Date: 17       Referring Physician: Patle      Admit Date: 2017      PT Recommendation and Plan    Visit Dx:    ICD-10-CM ICD-9-CM   1. Polyarticular arthritis M13.0 716.50   2. Renal insufficiency N28.9 593.9   3. Difficulty walking R26.2 719.7             Outcome Measures       17 0900 17 1400       How much help from another person do you currently need...    Turning from your back to your side while in flat bed without using bedrails? 3  -MD (susana) MS (taco) MD (renny) 4  -EM     Moving from lying on back to sitting on the side of a flat bed without bedrails? 3  -MD (susana) MS (taco) MD (c) 3  -EM     Moving to and from a bed to a chair (including a wheelchair)? 3  -MD (susana) MS (taco) MD (c) 3  -EM     Standing up from a chair using your arms (e.g., wheelchair, bedside chair)? 3  -MD (susana) MS (taco) MD (c) 3  -EM     Climbing 3-5 steps with a railing? 3  -MD (susana) MS (taco) MD (c) 3  -EM     To walk in hospital room? 3  -MD (r) MS (t) MD (c) 3  -EM     AM-PAC 6 Clicks Score 18  -MD (susana) MS (t) 19  -EM     Functional Assessment    Outcome Measure Options  AM-PAC 6 Clicks Basic Mobility (PT)  -EM       User Key  (r) = Recorded By, (t) = Taken By, (c) = Cosigned By    Initials Name Provider Type    MD Fatou Stovall, PT Physical Therapist    EM Cass Ashley, PT Physical Therapist    MS Liyah Camarillo, PT Student PT Student                PT Charges       17 0950          Time Calculation    Start Time 0920  -MD chen) MS (t) MD (c)      Stop Time 0935  -MD chen) MS (t) MD (c)      Time Calculation (min) 15 min  -MD (r) MS (t)      PT Received On 17  -MD (r) MS (t) MD (c)      PT - Next Appointment 17  -MD (r) MS (t) MD (renny)        User Key  (r) = Recorded By, (t) = Taken By, (c) = Cosigned By    Initials Name  Provider Type    MD Fatou Stovall, PT Physical Therapist    MS Liyah Camarillo, PT Student PT Student                  IP PT Goals       01/13/17 1604 01/12/17 1456       Bed Mobility PT LTG    Bed Mobility PT LTG, Date Established  01/12/17  -EM     Bed Mobility PT LTG, Time to Achieve  1 wk  -EM     Bed Mobility PT LTG, Activity Type  all bed mobility  -EM     Bed Mobility PT LTG, [place] Level  independent  -EM     Bed Mobility PT LTG, Date Goal Reviewed 01/13/17  -MD      Bed Mobility PT LTG, Outcome goal not met  -MD      Bed Mobility PT LTG, Reason Goal Not Met discharged from [facility]  -MD      Transfer Training PT LTG    Transfer Training PT LTG, Date Established  01/12/17  -EM     Transfer Training PT LTG, Time to Achieve  1 wk  -EM     Transfer Training PT LTG, Activity Type  all transfers  -EM     Transfer Training PT LTG, [place] Level  independent  -EM     Transfer Training PT  LTG, Date Goal Reviewed 01/13/17  -MD      Transfer Training PT LTG, Outcome goal not met  -MD      Transfer Training PT LTG, Reason Goal Not Met discharged from [facility]  -MD      Gait Training PT LTG    Gait Training Goal PT LTG, Date Established  01/12/17  -EM     Gait Training Goal PT LTG, Time to Achieve  1 wk  -EM     Gait Training Goal PT LTG, [place] Level  supervision required  -EM     Gait Training Goal PT LTG, Assist Device  walker, rolling  -EM     Gait Training Goal PT LTG, Distance to Achieve  150 feet   -EM     Gait Training Goal PT LTG, Date Goal Reviewed 01/13/17  -MD      Gait Training Goal PT LTG, Outcome goal not met  -MD      Gait Training Goal PT LTG, Reason Goal Not Met discharged from [facility]  -MD        User Key  (r) = Recorded By, (t) = Taken By, (c) = Cosigned By    Initials Name Provider Type    MD Fatou Stovall, PT Physical Therapist    EM Cass Ashley, PT Physical Therapist              PT Discharge Summary  Anticipated Discharge Disposition: home with assist  Reason for Discharge:  Discharge from facility  Outcomes Achieved: Refer to plan of care for updates on goals achieved  Discharge Destination: Home      Fatou Stovall, PT   1/13/2017

## 2017-01-13 NOTE — DISCHARGE SUMMARY
PHYSICIAN DISCHARGE SUMMARY                                                                        [facility]    Patient Identification:  Name: Farhan Ralph  Age: 51 y.o.  Sex: male  :  1965  MRN: [id number]  Primary Care Physician: Kelsey Foy MD    Admit date: 2017  Discharge date and time: 2017     Discharged Condition: good    Discharge Diagnoses:   Polyarticular arthritis - Gout:  Hyperglycemia:  A1C WNL. Stress reaction, steroids.   CKD III: - baseline not known. Avoid NSADs.   Leukocytosis: Resolved.   Stress response.     Hospital Course:  Pleasant 51-year-old gentleman presenting with severe polyarthritis with resulting immobility.  Please H&P for full details.  The joints most significantly involved included the right knee and bilateral ankles.  The right knee was tapped and the results were consistent with gout including intracellular crystals with the appearance of uric acid crystals.  He was started on colchicine and steroids.  He has improved considerably overnight.  The right knee effusion has improved considerably.  He is now able to walk with the assistance of a walker.  I am expecting daily improvement at this point.  He will be started on allopurinol.  I'll continue the steroids for another couple weeks.  One could consider weaning off the colchicine if his uric acid levels are in reasonable range in a month.  I have also discontinued his thiazide diuretics in hopes that this will assist in bringing his uric acid levels down.  He does have an elevated creatinine.  Baseline is not known but there is not much change with hydration.  I have advised that he avoid nonsteroidal anti-inflammatory drugs.  He also had an elevated blood sugar and a leukocytosis both of which have improved despite the patient being started on steroids.  The initial tap of the right knee did have scant growth of staph  epidermidis as well as staph warneri.  Clinically however this certainly did not appear to be a septic knee.  Infectious disease consultation was obtained for second opinion and they were in agreement that these were contaminants.   I did also draw labs to workup possible connective tissue diseases.  I suspect they're going to be unremarkable but this will need to be followed up as an outpatient.      Consults:     Consults     Date and Time Order Name Status Description    1/12/2017 1035 Inpatient Consult to Infectious Diseases Completed     1/12/2017 0301 Inpatient Consult to Orthopedic Surgery              Discharge Exam:  Physical Exam   Afebrile vital signs stable.  Well-developed well-nourished male in no apparent distress.  Lungs clear to auscultation good air movement.  Heart regular rate and rhythm.  Extremities no coming cyanosis or edema.  The right knee effusion is nearly resolved.  There is still some palpable swelling of the ankles present.  No erythema and no increased warmth to touch.  Alert oriented conversant cooperative and pleasant.     Disposition:  Home    Patient Instructions:    Farhan Ralph   Home Medication Instructions HARESH:[id number]    Printed on:01/13/17 1005   Medication Information                      allopurinol (ZYLOPRIM) 300 MG tablet  Take 1 tablet by mouth Daily.             atenolol (TENORMIN) 50 MG tablet  Take 1 tablet by mouth Daily.             colchicine 0.6 MG tablet  Take 1 tablet by mouth Daily.             HYDROcodone-acetaminophen (NORCO) 7.5-325 MG per tablet  Take 1 tablet by mouth Every 6 (Six) Hours As Needed for severe pain (7-10).             pantoprazole (PROTONIX) 40 MG EC tablet  Take 1 tablet by mouth Daily.             predniSONE (DELTASONE) 20 MG tablet  Take 2 tablets by mouth Daily With Breakfast. Take 2 pills daily for 1 week the take 1 pill daily for 1 week then stop.             zolpidem (AMBIEN) 10 MG tablet  Take 1 tablet by mouth At Night As  Needed.               No future appointments.  Referrals and Follow-ups to Schedule     Follow-Up    As directed    Follow up with Orthopedic Surgery as directed.   Follow Up Details:  PCP in 1 week.             Follow-up Information     Follow up with Kelsey Foy MD .    Specialty:  Family Medicine    Contact information:    [address] [facility] [address] [phone]          Discharge Order     Start     Ordered    01/13/17 1003  Discharge patient  Once     Expected Discharge Date:  01/13/17    Discharge Disposition:  Home or Self Care        01/13/17 1005            Total time spent discharging patient including evaluation,post hospitalization follow up,  medication and post hospitalization instructions and education total time exceeds 30 minutes.    Signed:  Stan Nunez MD  1/13/2017  10:05 AM

## 2017-01-13 NOTE — PROGRESS NOTES
Discharge Planning Assessment  [facility]     Patient Name: Farhan Ralph  MRN: [id number]  Today's Date: 1/13/2017    Admit Date: 1/11/2017          Discharge Needs Assessment     None            Discharge Plan       01/13/17 1045    Case Management/Social Work Plan    Additional Comments Per Dr. Nunez the patient will need a standard walker for about one month due to his gout. Called [facility] and they will deliver to room before patient leaves. LAW Abbott RN, CCP.     Final Note    Final Note Discharged to home on 1/13/17        Discharge Placement     No information found        Expected Discharge Date and Time     Expected Discharge Date Expected Discharge Time    Jan 13, 2017               Demographic Summary     None            Functional Status     None            Psychosocial     None            Abuse/Neglect     None            Legal     None            Substance Abuse     None            Patient Forms     None          Shelly Abbott, RN

## 2017-01-13 NOTE — PLAN OF CARE
Problem: Patient Care Overview (Adult)  Goal: Plan of Care Review    01/13/17 0952   Outcome Evaluation   Outcome Summary/Follow up Plan Pt's B ankle pain limits tolerance w ambulation, requiring multiple short breaks to rest.   Coping/Psychosocial Response Interventions   Plan Of Care Reviewed With patient   Patient Care Overview   Progress improving

## 2017-01-13 NOTE — PROGRESS NOTES
Acute Care - Physical Therapy Treatment Note  [facility]     Patient Name: Farhan Ralph  : 1965  MRN: [id number]  Today's Date: 2017  Onset of Illness/Injury or Date of Surgery Date: 17     Referring Physician: Patel    Admit Date: 2017    Visit Dx:    ICD-10-CM ICD-9-CM   1. Polyarticular arthritis M13.0 716.50   2. Renal insufficiency N28.9 593.9   3. Difficulty walking R26.2 719.7     Patient Active Problem List   Diagnosis   • Polyarticular arthritis               Adult Rehabilitation Note       17 0900          Rehab Assessment/Intervention    Discipline (P)  physical therapist  -MS      Document Type (P)  therapy note (daily note)  -MS      Subjective Information (P)  agree to therapy  -MS      Patient Effort, Rehab Treatment (P)  good  -MS      Symptoms Noted During/After Treatment (P)  increased pain  -MS      Recorded by [MS] Liyah Camarillo PT Student      Pain Assessment    Pain Assessment (P)  0-10  -MS      Pain Score (P)  7  -MS      Pain Location (P)  Ankle  -MS      Pain Orientation (P)  Right;Left  -MS      Pain Intervention(s) (P)  Repositioned;Ambulation/increased activity  -MS      Recorded by [MS] Liyah Camarillo PT Student      Cognitive Assessment/Intervention    Current Cognitive/Communication Assessment (P)  functional  -MS      Orientation Status (P)  oriented x 4  -MS      Follows Commands/Answers Questions (P)  100% of the time  -MS      Personal Safety (P)  WNL/WFL  -MS      Personal Safety Interventions (P)  fall prevention program maintained;gait belt;nonskid shoes/slippers when out of bed;supervised activity  -MS      Recorded by [MS] Liyah Camarillo PT Student      Bed Mobility, Assessment/Treatment    Bed Mobility, Roll Right, [place] (P)  supervision required  -MS      Bed Mobility, Scoot/Bridge, [place] (P)  not tested  -MS      Bed Mob, Supine to Sit, [place] (P)  supervision required  -MS      Bed Mob, Sit to Supine, [place]  (P)  not tested  -MS      Bed Mob, Sidelying to Sit, [place] (P)  supervision required  -MS      Bed Mob, Sit to Sidelying, [place] (P)  not tested  -MS      Recorded by [MS] Liyah Camarillo PT Student      Transfer Assessment/Treatment    Transfers, Sit-Stand [place] (P)  contact guard assist;verbal cues required  -MS      Transfers, Stand-Sit [place] (P)  contact guard assist  -MS      Transfers, Sit-Stand-Sit, Assist Device (P)  rolling walker  -MS      Transfer, Safety Issues (P)  step length decreased  -MS      Transfer, Impairments (P)  pain;strength decreased  -MS      Recorded by [MS] Liyah Camarillo PT Student      Gait Assessment/Treatment    Gait, [place] Level (P)  contact guard assist;verbal cues required  -MS      Gait, Assistive Device (P)  rolling walker  -MS      Gait, Distance (Feet) (P)  50  -MS      Gait, Gait Deviations (P)  bilateral:;antalgic;gaudencio decreased;step length decreased;right:;swing-to-stance ratio decreased  -MS      Gait, Safety Issues (P)  step length decreased  -MS      Gait, Impairments (P)  strength decreased;pain  -MS      Recorded by [MS] Liyah Camarillo PT Student      Positioning and Restraints    Pre-Treatment Position (P)  in bed  -MS      Post Treatment Position (P)  bed  -MS      In Bed (P)  sitting EOB;call light within reach;encouraged to call for assist  -MS      Recorded by [MS] Liyah Camarillo PT Student        User Key  (r) = Recorded By, (t) = Taken By, (c) = Cosigned By    Initials Name Effective Dates    MS Liyah Camarillo PT Student 01/09/17 -                 IP PT Goals       01/12/17 1456          Bed Mobility PT LTG    Bed Mobility PT LTG, Date Established 01/12/17  -EM      Bed Mobility PT LTG, Time to Achieve 1 wk  -EM      Bed Mobility PT LTG, Activity Type all bed mobility  -EM      Bed Mobility PT LTG, [place] Level independent  -EM      Transfer Training PT LTG    Transfer Training PT LTG, Date Established 01/12/17  -EM       Transfer Training PT LTG, Time to Achieve 1 wk  -EM      Transfer Training PT LTG, Activity Type all transfers  -EM      Transfer Training PT LTG, [place] Level independent  -EM      Gait Training PT LTG    Gait Training Goal PT LTG, Date Established 01/12/17  -EM      Gait Training Goal PT LTG, Time to Achieve 1 wk  -EM      Gait Training Goal PT LTG, [place] Level supervision required  -EM      Gait Training Goal PT LTG, Assist Device walker, rolling  -EM      Gait Training Goal PT LTG, Distance to Achieve 150 feet   -EM        User Key  (r) = Recorded By, (t) = Taken By, (c) = Cosigned By    Initials Name Provider Type    EM Cass Ashley, PT Physical Therapist          Physical Therapy Education     Title: PT OT SLP Therapies (Active)     Topic: Physical Therapy (Active)     Point: Mobility training (Active)    Learning Progress Summary    Learner Readiness Method Response Comment Documented by Status   Patient Acceptance E NR  EM 01/12/17 1456 Active               Point: Precautions (Done)    Learning Progress Summary    Learner Readiness Method Response Comment Documented by Status   Patient Acceptance E VU  MS 01/13/17 0952 Done                      User Key     Initials Effective Dates Name Provider Type Discipline    EM 12/01/15 -  Cass Ashley, PT Physical Therapist PT    MS 01/09/17 -  Liyah Camarillo PT Student PT Student PT                    PT Recommendation and Plan  Anticipated Discharge Disposition: home with assist  Planned Therapy Interventions: gait training, home exercise program  PT Frequency: daily  Plan of Care Review  Plan Of Care Reviewed With: (P) patient  Progress: (P) improving  Outcome Summary/Follow up Plan: (P) Pt's B ankle pain limits tolerance w ambulation, requiring multiple short breaks to rest.          Outcome Measures       01/13/17 0900 01/12/17 1400       How much help from another person do you currently need...    Turning from your back to your side  while in flat bed without using bedrails? (P)  3  -MS 4  -EM     Moving from lying on back to sitting on the side of a flat bed without bedrails? (P)  3  -MS 3  -EM     Moving to and from a bed to a chair (including a wheelchair)? (P)  3  -MS 3  -EM     Standing up from a chair using your arms (e.g., wheelchair, bedside chair)? (P)  3  -MS 3  -EM     Climbing 3-5 steps with a railing? (P)  3  -MS 3  -EM     To walk in hospital room? (P)  3  -MS 3  -EM     AM-PAC 6 Clicks Score (P)  18  -MS 19  -EM     Functional Assessment    Outcome Measure Options  AM-PAC 6 Clicks Basic Mobility (PT)  -EM       User Key  (r) = Recorded By, (t) = Taken By, (c) = Cosigned By    Initials Name Provider Type    EM Cass Ashley, PT Physical Therapist    MS Liyah Camarillo, PT Student PT Student           Time Calculation:         PT Charges       01/13/17 0950          Time Calculation    Start Time (P)  0920  -MS      Stop Time (P)  0935  -MS      Time Calculation (min) (P)  15 min  -MS      PT Received On (P)  01/13/17  -MS      PT - Next Appointment (P)  01/14/17  -MS        User Key  (r) = Recorded By, (t) = Taken By, (c) = Cosigned By    Initials Name Provider Type    MS Liyah Camarillo PT Student PT Student          Therapy Charges for Today     Code Description Service Date Service Provider Modifiers Qty    78573879796 HC PT THER PROC EA 15 MIN 1/13/2017 Liyah Camarillo PT Student GP 1          PT G-Codes  Outcome Measure Options: AM-PAC 6 Clicks Basic Mobility (PT)    Liyah Camarillo PT Student  1/13/2017

## 2017-01-13 NOTE — PLAN OF CARE
Problem: Patient Care Overview (Adult)  Goal: Plan of Care Review  Outcome: Ongoing (interventions implemented as appropriate)    01/13/17 0431   Outcome Evaluation   Outcome Summary/Follow up Plan Pt has been throughout shift w/o c/o pain/discomfort. Able to ambulate independently to bathroom, w/o c/o. Continue to monitor and tx per POC and MD orders.    Coping/Psychosocial Response Interventions   Plan Of Care Reviewed With patient       Goal: Adult Individualization and Mutuality  Outcome: Ongoing (interventions implemented as appropriate)  Goal: Discharge Needs Assessment  Outcome: Ongoing (interventions implemented as appropriate)    Problem: Pain, Acute (Adult)  Goal: Identify Related Risk Factors and Signs and Symptoms  Outcome: Ongoing (interventions implemented as appropriate)  Goal: Acceptable Pain Control/Comfort Level  Outcome: Ongoing (interventions implemented as appropriate)    Problem: Mobility, Physical Impaired (Adult)  Goal: Identify Related Risk Factors and Signs and Symptoms  Outcome: Ongoing (interventions implemented as appropriate)  Goal: Enhanced Mobility Skills  Outcome: Ongoing (interventions implemented as appropriate)  Goal: Enhanced Functionality Ability  Outcome: Ongoing (interventions implemented as appropriate)

## 2017-01-13 NOTE — PLAN OF CARE
Problem: Inpatient Physical Therapy  Goal: Bed Mobility Goal LTG- PT  Outcome: Unable to achieve outcome(s) by discharge Date Met:  01/13/17 01/13/17 1604   Bed Mobility PT LTG   Bed Mobility PT LTG, Date Goal Reviewed 01/13/17   Bed Mobility PT LTG, Outcome goal not met   Bed Mobility PT LTG, Reason Goal Not Met discharged from facility       Goal: Transfer Training Goal 1 LTG- PT  Outcome: Unable to achieve outcome(s) by discharge Date Met:  01/13/17 01/13/17 1604   Transfer Training PT LTG   Transfer Training PT LTG, Date Goal Reviewed 01/13/17   Transfer Training PT LTG, Outcome goal not met   Transfer Training PT LTG, Reason Goal Not Met discharged from facility       Goal: Gait Training Goal LTG- PT  Outcome: Unable to achieve outcome(s) by discharge Date Met:  01/13/17 01/13/17 1604   Gait Training PT LTG   Gait Training Goal PT LTG, Date Goal Reviewed 01/13/17   Gait Training Goal PT LTG, Outcome goal not met   Gait Training Goal PT LTG, Reason Goal Not Met discharged from facility

## 2017-01-13 NOTE — PROGRESS NOTES
DATE OF ADMISSION: 1/11/2017 10:06 PM     LOS: 1 day     Subjective :   Feeling better.  Walked 50 feet with P.T. Yesterday.    Objective :    Vital signs in last 24 hours:  Vitals:    01/12/17 0857 01/12/17 1403 01/12/17 1930 01/13/17 0450   BP: 116/75 126/79 125/72 127/80   BP Location: Left arm Left arm Left arm Left arm   Patient Position: Lying Sitting Lying Lying   Pulse: 58 70 67 60   Resp: 18 18 16 16   Temp: 97.7 °F (36.5 °C) 97.6 °F (36.4 °C) 97.2 °F (36.2 °C) 96.3 °F (35.7 °C)   TempSrc: Oral Oral Oral Oral   SpO2: 95% 96% 97% 96%   Weight:   259 lb 3.2 oz (118 kg)    Height:           PHYSICAL EXAM:  Patient is calm, in no acute distress, awake and oriented x 3.  Skin on right knee and bilateral ankles are clean, dry and intact.  No signs of infection.  Swelling is appropriate in amount.  ROM improved with less pain.  Homans test is negative.  Patient is neurovascularly intact distally.    LABS:    Results from last 7 days  Lab Units 01/13/17  0458   WBC 10*3/mm3 8.92   HEMOGLOBIN g/dL 11.8*   HEMATOCRIT % 36.9*   PLATELETS 10*3/mm3 318       Results from last 7 days  Lab Units 01/13/17  0458   SODIUM mmol/L 138   POTASSIUM mmol/L 4.6   CHLORIDE mmol/L 101   TOTAL CO2 mmol/L 22.9   BUN mg/dL 45*   CREATININE mg/dL 1.51*   GLUCOSE mg/dL 124*   CALCIUM mg/dL 8.8           ASSESSMENT:  Gout    Plan:  Would start allopurinol.  Continue Physical Therapy, increase mobility and range of motion as tolerated.  Dispo planning for home anytime.  Follow up with me in 1 week.  [phone]    Saqib Sweeney MD    Date: 1/13/2017  Time: 8:09 AM

## 2017-01-14 LAB — CCP IGA+IGG SERPL IA-ACNC: 7 UNITS (ref 0–19)
